# Patient Record
Sex: FEMALE | Race: WHITE | NOT HISPANIC OR LATINO | Employment: FULL TIME | URBAN - METROPOLITAN AREA
[De-identification: names, ages, dates, MRNs, and addresses within clinical notes are randomized per-mention and may not be internally consistent; named-entity substitution may affect disease eponyms.]

---

## 2023-05-22 LAB
ALANINE AMINOTRANSFERASE (SGPT) (U/L) IN SER/PLAS: 19 U/L (ref 7–45)
ALBUMIN (G/DL) IN SER/PLAS: 4.3 G/DL (ref 3.4–5)
ALKALINE PHOSPHATASE (U/L) IN SER/PLAS: 64 U/L (ref 33–110)
ANION GAP IN SER/PLAS: 14 MMOL/L (ref 10–20)
ASPARTATE AMINOTRANSFERASE (SGOT) (U/L) IN SER/PLAS: 14 U/L (ref 9–39)
BASOPHILS (10*3/UL) IN BLOOD BY AUTOMATED COUNT: 0.06 X10E9/L (ref 0–0.1)
BASOPHILS/100 LEUKOCYTES IN BLOOD BY AUTOMATED COUNT: 0.5 % (ref 0–2)
BILIRUBIN TOTAL (MG/DL) IN SER/PLAS: 0.5 MG/DL (ref 0–1.2)
CALCIUM (MG/DL) IN SER/PLAS: 9.1 MG/DL (ref 8.6–10.3)
CARBON DIOXIDE, TOTAL (MMOL/L) IN SER/PLAS: 21 MMOL/L (ref 21–32)
CHLORIDE (MMOL/L) IN SER/PLAS: 108 MMOL/L (ref 98–107)
CREATININE (MG/DL) IN SER/PLAS: 0.87 MG/DL (ref 0.5–1.05)
EOSINOPHILS (10*3/UL) IN BLOOD BY AUTOMATED COUNT: 0.1 X10E9/L (ref 0–0.7)
EOSINOPHILS/100 LEUKOCYTES IN BLOOD BY AUTOMATED COUNT: 0.8 % (ref 0–6)
ERYTHROCYTE DISTRIBUTION WIDTH (RATIO) BY AUTOMATED COUNT: 13 % (ref 11.5–14.5)
ERYTHROCYTE MEAN CORPUSCULAR HEMOGLOBIN CONCENTRATION (G/DL) BY AUTOMATED: 32.1 G/DL (ref 32–36)
ERYTHROCYTE MEAN CORPUSCULAR VOLUME (FL) BY AUTOMATED COUNT: 87 FL (ref 80–100)
ERYTHROCYTES (10*6/UL) IN BLOOD BY AUTOMATED COUNT: 5.25 X10E12/L (ref 4–5.2)
GFR FEMALE: 88 ML/MIN/1.73M2
GLUCOSE (MG/DL) IN SER/PLAS: 98 MG/DL (ref 74–99)
HEMATOCRIT (%) IN BLOOD BY AUTOMATED COUNT: 45.8 % (ref 36–46)
HEMOGLOBIN (G/DL) IN BLOOD: 14.7 G/DL (ref 12–16)
IMMATURE GRANULOCYTES/100 LEUKOCYTES IN BLOOD BY AUTOMATED COUNT: 0.4 % (ref 0–0.9)
LEUKOCYTES (10*3/UL) IN BLOOD BY AUTOMATED COUNT: 12.4 X10E9/L (ref 4.4–11.3)
LYMPHOCYTES (10*3/UL) IN BLOOD BY AUTOMATED COUNT: 3.63 X10E9/L (ref 1.2–4.8)
LYMPHOCYTES/100 LEUKOCYTES IN BLOOD BY AUTOMATED COUNT: 29.3 % (ref 13–44)
MONOCYTES (10*3/UL) IN BLOOD BY AUTOMATED COUNT: 1.01 X10E9/L (ref 0.1–1)
MONOCYTES/100 LEUKOCYTES IN BLOOD BY AUTOMATED COUNT: 8.2 % (ref 2–10)
NEUTROPHILS (10*3/UL) IN BLOOD BY AUTOMATED COUNT: 7.53 X10E9/L (ref 1.2–7.7)
NEUTROPHILS/100 LEUKOCYTES IN BLOOD BY AUTOMATED COUNT: 60.8 % (ref 40–80)
PLATELETS (10*3/UL) IN BLOOD AUTOMATED COUNT: 389 X10E9/L (ref 150–450)
POTASSIUM (MMOL/L) IN SER/PLAS: 3.8 MMOL/L (ref 3.5–5.3)
PROTEIN TOTAL: 7.8 G/DL (ref 6.4–8.2)
SODIUM (MMOL/L) IN SER/PLAS: 139 MMOL/L (ref 136–145)
UREA NITROGEN (MG/DL) IN SER/PLAS: 14 MG/DL (ref 6–23)

## 2023-05-25 LAB
NIL(NEG) CONTROL SPOT COUNT: NORMAL
PANEL A SPOT COUNT: 1
PANEL B SPOT COUNT: 0
POS CONTROL SPOT COUNT: NORMAL
T-SPOT. TB INTERPRETATION: NEGATIVE

## 2023-10-13 ENCOUNTER — OFFICE VISIT (OUTPATIENT)
Dept: PRIMARY CARE | Facility: CLINIC | Age: 38
End: 2023-10-13
Payer: COMMERCIAL

## 2023-10-13 VITALS
BODY MASS INDEX: 42.95 KG/M2 | WEIGHT: 257.8 LBS | HEART RATE: 76 BPM | HEIGHT: 65 IN | SYSTOLIC BLOOD PRESSURE: 140 MMHG | DIASTOLIC BLOOD PRESSURE: 88 MMHG | OXYGEN SATURATION: 99 %

## 2023-10-13 DIAGNOSIS — F41.9 ANXIETY: ICD-10-CM

## 2023-10-13 DIAGNOSIS — I10 PRIMARY HYPERTENSION: Primary | ICD-10-CM

## 2023-10-13 DIAGNOSIS — Z23 NEED FOR VACCINATION: ICD-10-CM

## 2023-10-13 PROBLEM — L40.9 PSORIASIS: Status: ACTIVE | Noted: 2023-07-05

## 2023-10-13 PROCEDURE — 1036F TOBACCO NON-USER: CPT | Performed by: NURSE PRACTITIONER

## 2023-10-13 PROCEDURE — 3077F SYST BP >= 140 MM HG: CPT | Performed by: NURSE PRACTITIONER

## 2023-10-13 PROCEDURE — 90686 IIV4 VACC NO PRSV 0.5 ML IM: CPT | Performed by: NURSE PRACTITIONER

## 2023-10-13 PROCEDURE — 99204 OFFICE O/P NEW MOD 45 MIN: CPT | Performed by: NURSE PRACTITIONER

## 2023-10-13 PROCEDURE — 3079F DIAST BP 80-89 MM HG: CPT | Performed by: NURSE PRACTITIONER

## 2023-10-13 PROCEDURE — 93000 ELECTROCARDIOGRAM COMPLETE: CPT | Performed by: NURSE PRACTITIONER

## 2023-10-13 PROCEDURE — 90471 IMMUNIZATION ADMIN: CPT | Performed by: NURSE PRACTITIONER

## 2023-10-13 RX ORDER — SERTRALINE HYDROCHLORIDE 25 MG/1
25 TABLET, FILM COATED ORAL DAILY
Qty: 30 TABLET | Refills: 1 | Status: SHIPPED | OUTPATIENT
Start: 2023-10-13 | End: 2023-12-08 | Stop reason: SDUPTHER

## 2023-10-13 RX ORDER — ATENOLOL 25 MG/1
25 TABLET ORAL DAILY
Qty: 30 TABLET | Refills: 5 | Status: SHIPPED | OUTPATIENT
Start: 2023-10-13 | End: 2024-04-03 | Stop reason: SDUPTHER

## 2023-10-13 ASSESSMENT — ENCOUNTER SYMPTOMS
HYPERTENSION: 1
DECREASED CONCENTRATION: 1
MALAISE: 0
RESTLESSNESS: 1
MUSCLE TENSION: 1
PALPITATIONS: 1
NERVOUS/ANXIOUS: 1
IRRITABILITY: 0
SHORTNESS OF BREATH: 0

## 2023-10-13 ASSESSMENT — PATIENT HEALTH QUESTIONNAIRE - PHQ9
SUM OF ALL RESPONSES TO PHQ9 QUESTIONS 1 AND 2: 1
3. TROUBLE FALLING OR STAYING ASLEEP OR SLEEPING TOO MUCH: SEVERAL DAYS
7. TROUBLE CONCENTRATING ON THINGS, SUCH AS READING THE NEWSPAPER OR WATCHING TELEVISION: SEVERAL DAYS
5. POOR APPETITE OR OVEREATING: NOT AT ALL
2. FEELING DOWN, DEPRESSED OR HOPELESS: NOT AT ALL
10. IF YOU CHECKED OFF ANY PROBLEMS, HOW DIFFICULT HAVE THESE PROBLEMS MADE IT FOR YOU TO DO YOUR WORK, TAKE CARE OF THINGS AT HOME, OR GET ALONG WITH OTHER PEOPLE: SOMEWHAT DIFFICULT
1. LITTLE INTEREST OR PLEASURE IN DOING THINGS: SEVERAL DAYS
4. FEELING TIRED OR HAVING LITTLE ENERGY: SEVERAL DAYS
8. MOVING OR SPEAKING SO SLOWLY THAT OTHER PEOPLE COULD HAVE NOTICED. OR THE OPPOSITE, BEING SO FIGETY OR RESTLESS THAT YOU HAVE BEEN MOVING AROUND A LOT MORE THAN USUAL: SEVERAL DAYS
SUM OF ALL RESPONSES TO PHQ QUESTIONS 1-9: 7
9. THOUGHTS THAT YOU WOULD BE BETTER OFF DEAD, OR OF HURTING YOURSELF: NOT AT ALL
6. FEELING BAD ABOUT YOURSELF - OR THAT YOU ARE A FAILURE OR HAVE LET YOURSELF OR YOUR FAMILY DOWN: MORE THAN HALF THE DAYS

## 2023-10-13 ASSESSMENT — COLUMBIA-SUICIDE SEVERITY RATING SCALE - C-SSRS
2. HAVE YOU ACTUALLY HAD ANY THOUGHTS OF KILLING YOURSELF?: NO
6. HAVE YOU EVER DONE ANYTHING, STARTED TO DO ANYTHING, OR PREPARED TO DO ANYTHING TO END YOUR LIFE?: NO
1. IN THE PAST MONTH, HAVE YOU WISHED YOU WERE DEAD OR WISHED YOU COULD GO TO SLEEP AND NOT WAKE UP?: NO

## 2023-10-13 ASSESSMENT — ANXIETY QUESTIONNAIRES
3. WORRYING TOO MUCH ABOUT DIFFERENT THINGS: NEARLY EVERY DAY
7. FEELING AFRAID AS IF SOMETHING AWFUL MIGHT HAPPEN: MORE THAN HALF THE DAYS
5. BEING SO RESTLESS THAT IT IS HARD TO SIT STILL: NEARLY EVERY DAY
4. TROUBLE RELAXING: NEARLY EVERY DAY
6. BECOMING EASILY ANNOYED OR IRRITABLE: NEARLY EVERY DAY
IF YOU CHECKED OFF ANY PROBLEMS ON THIS QUESTIONNAIRE, HOW DIFFICULT HAVE THESE PROBLEMS MADE IT FOR YOU TO DO YOUR WORK, TAKE CARE OF THINGS AT HOME, OR GET ALONG WITH OTHER PEOPLE: SOMEWHAT DIFFICULT
1. FEELING NERVOUS, ANXIOUS, OR ON EDGE: NEARLY EVERY DAY
GAD7 TOTAL SCORE: 19
2. NOT BEING ABLE TO STOP OR CONTROL WORRYING: MORE THAN HALF THE DAYS

## 2023-10-13 NOTE — PROGRESS NOTES
"Subjective   Patient ID: Sarahy Rehman is a 37 y.o. female who presents for Hypertension, Establish Care, and Anxiety.    Hypertension  This is a recurrent problem. The problem is uncontrolled. Associated symptoms include anxiety, chest pain and palpitations. Pertinent negatives include no shortness of breath. Risk factors for coronary artery disease include obesity. Past treatments include diuretics. There is no history of angina, kidney disease, CAD/MI, CVA, heart failure, left ventricular hypertrophy, PVD or retinopathy. There is no history of chronic renal disease, coarctation of the aorta, hyperaldosteronism, hypercortisolism, hyperparathyroidism, a hypertension causing med, pheochromocytoma, renovascular disease, sleep apnea or a thyroid problem.   Anxiety  Presents for initial visit. Onset was 6 to 12 months ago. The problem has been gradually worsening. Symptoms include chest pain, decreased concentration, muscle tension, nervous/anxious behavior, palpitations and restlessness. Patient reports no irritability, malaise, shortness of breath or suicidal ideas. Primary symptoms comment: left upper abdomen. Symptoms occur most days. The symptoms are aggravated by work stress. The quality of sleep is fair.     Her past medical history is significant for anxiety/panic attacks. There is no history of anemia, arrhythmia, asthma, hyperthyroidism or suicide attempts.        Review of Systems   Constitutional:  Negative for irritability.   Respiratory:  Negative for shortness of breath.    Cardiovascular:  Positive for chest pain and palpitations.   Psychiatric/Behavioral:  Positive for decreased concentration. Negative for suicidal ideas. The patient is nervous/anxious.        Objective   /88   Pulse 76   Ht 1.651 m (5' 5\")   Wt 117 kg (257 lb 12.8 oz)   LMP 09/08/2023 (Approximate)   SpO2 99%   BMI 42.90 kg/m²     Physical Exam  Constitutional:       Appearance: She is obese.   Cardiovascular:      Rate " and Rhythm: Normal rate.      Heart sounds: Normal heart sounds.   Pulmonary:      Effort: Pulmonary effort is normal.      Breath sounds: Normal breath sounds.   Abdominal:      General: Bowel sounds are normal.      Palpations: Abdomen is soft.   Musculoskeletal:         General: Normal range of motion.   Skin:     General: Skin is warm.      Capillary Refill: Capillary refill takes less than 2 seconds.   Neurological:      General: No focal deficit present.      Mental Status: She is alert and oriented to person, place, and time.   Psychiatric:         Mood and Affect: Mood normal.         Behavior: Behavior normal.         Thought Content: Thought content normal.         Judgment: Judgment normal.         Assessment/Plan   Problem List Items Addressed This Visit             ICD-10-CM    Hypertension - Primary I10    Relevant Medications    atenolol (Tenormin) 25 mg tablet    Other Relevant Orders    Lipid Panel     Other Visit Diagnoses         Codes    Need for vaccination     Z23    Relevant Orders    Flu vaccine (IIV4) age 6 months and greater, preservative free    Anxiety     F41.9    Relevant Medications    sertraline (Zoloft) 25 mg tablet    Other Relevant Orders    TSH with reflex to Free T4 if abnormal        Reviewed patient's medical history establishing care.  Discussed with increasing anxiety and worsening of symptoms we will start her on Zoloft.  Patient reports she has a longstanding history of anxiety.  Routine blood work ordered.  Patient with history of hypertension was told to follow-up after pregnancy we will start her on atenolol to help with blood pressure as well as physical symptoms of anxiety.  Discussed close follow-up with patient

## 2023-10-13 NOTE — PATIENT INSTRUCTIONS
It was a genuine pleasure to meet you. As we discussed, I will be leaving the practice, but your continuous care is crucial, and thus, I want to ensure a smooth transition for you.    Medication and Lab Work:  We are initiating new medications for anxiety and blood pressure. Please monitor for any side effects or changes and inform the office promptly.  I’ve ordered routine blood work for you. Please remember to fast (nothing to eat or drink except black coffee, black tea, or water, with no creamer or sugar) for 10 hours before the test. We will notify you once the results are available.  Follow-Up in 6-8 Weeks:  It's important to review how you’re doing on the new medications and discuss your blood work results.  Transition to Dr. Rincon:  Dr. Rincon in our office is accepting new patients and is highly competent in providing you with excellent care moving forward.  Ensure to schedule your upcoming follow-ups with Dr. Rincon to ensure consistency and continuation in your care.  A letter with additional information, including available nurse practitioners in the [facility] location, will be mailed to you shortly.  Next Steps for You:  Keep an Eye on Symptoms: Note any changes once you begin the new medications.  Monitor Your Blood Pressure: Regular checks will help us understand how well the medication is working.  Upcoming Appointments: Ensure your follow-up is booked, and complete your blood work as instructed.  While I am saddened to be leaving, I am confident in the continued care that Dr. Rincon and the team will provide you. Thank you for allowing me to be part of your healthcare journey, and I wish you all the best in your future health and wellbeing.    Exercise can have significant benefits for managing and reducing anxiety. Regular physical activity has been shown to positively impact both physical and mental health, including helping to alleviate symptoms of anxiety. Here are some ways exercise can help  "with anxiety:    Release of Neurotransmitters: Exercise stimulates the release of neurotransmitters such as endorphins, serotonin, and dopamine. These \"feel-good\" chemicals can improve mood and reduce feelings of anxiety.    Stress Reduction: Physical activity can help reduce stress levels by lowering the body's stress hormones like cortisol. It provides an outlet for releasing tension and pent-up energy.    Distraction and Focus: Engaging in exercise can divert your focus away from anxious thoughts and worries, promoting a state of mindfulness and being present in the moment.    Increased Self-Efficacy: Regular exercise can boost self-confidence and self-esteem, making it easier to cope with anxiety-inducing situations.    Better Sleep: Exercise can improve the quality of sleep, which is essential for managing anxiety. Restful sleep helps the body and mind recover from daily stressors.    Social Interaction: Participating in group exercise classes or team sports can provide opportunities for social interaction, which can help combat feelings of isolation and loneliness.    Energy Release: Anxiety can often lead to restlessness and nervous energy. Physical activity provides a healthy outlet for this energy, reducing restlessness and promoting relaxation.    Long-Term Benefits: Consistent exercise over time can lead to better overall physical health, which can indirectly improve mental well-being and resilience to stressors.  "

## 2023-10-27 ENCOUNTER — SPECIALTY PHARMACY (OUTPATIENT)
Dept: PHARMACY | Facility: CLINIC | Age: 38
End: 2023-10-27

## 2023-10-27 ENCOUNTER — PHARMACY VISIT (OUTPATIENT)
Dept: PHARMACY | Facility: CLINIC | Age: 38
End: 2023-10-27
Payer: COMMERCIAL

## 2023-10-27 PROCEDURE — RXMED WILLOW AMBULATORY MEDICATION CHARGE

## 2023-10-28 ENCOUNTER — LAB (OUTPATIENT)
Dept: LAB | Facility: LAB | Age: 38
End: 2023-10-28
Payer: COMMERCIAL

## 2023-10-28 DIAGNOSIS — F41.9 ANXIETY: ICD-10-CM

## 2023-10-28 DIAGNOSIS — I10 PRIMARY HYPERTENSION: ICD-10-CM

## 2023-10-28 LAB
CHOLEST SERPL-MCNC: 172 MG/DL (ref 0–199)
CHOLESTEROL/HDL RATIO: 4
HDLC SERPL-MCNC: 43.5 MG/DL
LDLC SERPL CALC-MCNC: 100 MG/DL
NON HDL CHOLESTEROL: 129 MG/DL (ref 0–149)
TRIGL SERPL-MCNC: 145 MG/DL (ref 0–149)
TSH SERPL-ACNC: 1.78 MIU/L (ref 0.44–3.98)
VLDL: 29 MG/DL (ref 0–40)

## 2023-10-28 PROCEDURE — 80061 LIPID PANEL: CPT

## 2023-10-28 PROCEDURE — 84443 ASSAY THYROID STIM HORMONE: CPT

## 2023-10-28 PROCEDURE — 36415 COLL VENOUS BLD VENIPUNCTURE: CPT

## 2023-11-07 DIAGNOSIS — B37.2 CANDIDAL SKIN INFECTION: Primary | ICD-10-CM

## 2023-11-07 RX ORDER — NYSTATIN 100000 U/G
CREAM TOPICAL
Qty: 30 G | Refills: 3 | Status: SHIPPED | OUTPATIENT
Start: 2023-11-07

## 2023-11-22 ENCOUNTER — SPECIALTY PHARMACY (OUTPATIENT)
Dept: PHARMACY | Facility: CLINIC | Age: 38
End: 2023-11-22

## 2023-11-22 ENCOUNTER — PHARMACY VISIT (OUTPATIENT)
Dept: PHARMACY | Facility: CLINIC | Age: 38
End: 2023-11-22
Payer: COMMERCIAL

## 2023-11-22 PROCEDURE — RXMED WILLOW AMBULATORY MEDICATION CHARGE

## 2023-11-29 ENCOUNTER — SPECIALTY PHARMACY (OUTPATIENT)
Dept: PHARMACY | Facility: CLINIC | Age: 38
End: 2023-11-29

## 2023-12-08 ENCOUNTER — OFFICE VISIT (OUTPATIENT)
Dept: PRIMARY CARE | Facility: CLINIC | Age: 38
End: 2023-12-08
Payer: COMMERCIAL

## 2023-12-08 ENCOUNTER — APPOINTMENT (OUTPATIENT)
Dept: PRIMARY CARE | Facility: CLINIC | Age: 38
End: 2023-12-08
Payer: COMMERCIAL

## 2023-12-08 VITALS
BODY MASS INDEX: 40.38 KG/M2 | HEART RATE: 51 BPM | OXYGEN SATURATION: 97 % | HEIGHT: 66 IN | DIASTOLIC BLOOD PRESSURE: 78 MMHG | SYSTOLIC BLOOD PRESSURE: 114 MMHG | WEIGHT: 251.25 LBS

## 2023-12-08 DIAGNOSIS — F41.9 ANXIETY: Primary | ICD-10-CM

## 2023-12-08 PROCEDURE — 3074F SYST BP LT 130 MM HG: CPT | Performed by: FAMILY MEDICINE

## 2023-12-08 PROCEDURE — 3078F DIAST BP <80 MM HG: CPT | Performed by: FAMILY MEDICINE

## 2023-12-08 PROCEDURE — 99213 OFFICE O/P EST LOW 20 MIN: CPT | Performed by: FAMILY MEDICINE

## 2023-12-08 PROCEDURE — 1036F TOBACCO NON-USER: CPT | Performed by: FAMILY MEDICINE

## 2023-12-08 RX ORDER — SERTRALINE HYDROCHLORIDE 25 MG/1
25 TABLET, FILM COATED ORAL DAILY
Qty: 90 TABLET | Refills: 1 | Status: SHIPPED | OUTPATIENT
Start: 2023-12-08 | End: 2024-05-30 | Stop reason: SDUPTHER

## 2023-12-08 ASSESSMENT — PATIENT HEALTH QUESTIONNAIRE - PHQ9
5. POOR APPETITE OR OVEREATING: NOT AT ALL
SUM OF ALL RESPONSES TO PHQ9 QUESTIONS 1 AND 2: 0
3. TROUBLE FALLING OR STAYING ASLEEP OR SLEEPING TOO MUCH: NOT AT ALL
10. IF YOU CHECKED OFF ANY PROBLEMS, HOW DIFFICULT HAVE THESE PROBLEMS MADE IT FOR YOU TO DO YOUR WORK, TAKE CARE OF THINGS AT HOME, OR GET ALONG WITH OTHER PEOPLE: NOT DIFFICULT AT ALL
4. FEELING TIRED OR HAVING LITTLE ENERGY: MORE THAN HALF THE DAYS
1. LITTLE INTEREST OR PLEASURE IN DOING THINGS: NOT AT ALL
9. THOUGHTS THAT YOU WOULD BE BETTER OFF DEAD, OR OF HURTING YOURSELF: NOT AT ALL
2. FEELING DOWN, DEPRESSED OR HOPELESS: NOT AT ALL
1. LITTLE INTEREST OR PLEASURE IN DOING THINGS: NOT AT ALL
SUM OF ALL RESPONSES TO PHQ9 QUESTIONS 1 AND 2: 0
SUM OF ALL RESPONSES TO PHQ QUESTIONS 1-9: 2
8. MOVING OR SPEAKING SO SLOWLY THAT OTHER PEOPLE COULD HAVE NOTICED. OR THE OPPOSITE, BEING SO FIGETY OR RESTLESS THAT YOU HAVE BEEN MOVING AROUND A LOT MORE THAN USUAL: NOT AT ALL
6. FEELING BAD ABOUT YOURSELF - OR THAT YOU ARE A FAILURE OR HAVE LET YOURSELF OR YOUR FAMILY DOWN: NOT AT ALL
2. FEELING DOWN, DEPRESSED OR HOPELESS: NOT AT ALL
7. TROUBLE CONCENTRATING ON THINGS, SUCH AS READING THE NEWSPAPER OR WATCHING TELEVISION: NOT AT ALL

## 2023-12-08 ASSESSMENT — ENCOUNTER SYMPTOMS
NAUSEA: 0
WHEEZING: 0
SHORTNESS OF BREATH: 0
NUMBNESS: 0
CONFUSION: 0
DIZZINESS: 0
DEPRESSION: 0
LIGHT-HEADEDNESS: 0
UNEXPECTED WEIGHT CHANGE: 0
DYSURIA: 0
ABDOMINAL PAIN: 0
DIARRHEA: 0
FEVER: 0
COUGH: 0
WEAKNESS: 0
CHILLS: 0
TROUBLE SWALLOWING: 0
VOMITING: 0
BLOOD IN STOOL: 0
OCCASIONAL FEELINGS OF UNSTEADINESS: 0
DIFFICULTY URINATING: 0
LOSS OF SENSATION IN FEET: 0

## 2023-12-08 ASSESSMENT — ANXIETY QUESTIONNAIRES
5. BEING SO RESTLESS THAT IT IS HARD TO SIT STILL: NOT AT ALL
GAD7 TOTAL SCORE: 7
7. FEELING AFRAID AS IF SOMETHING AWFUL MIGHT HAPPEN: NOT AT ALL
2. NOT BEING ABLE TO STOP OR CONTROL WORRYING: SEVERAL DAYS
IF YOU CHECKED OFF ANY PROBLEMS ON THIS QUESTIONNAIRE, HOW DIFFICULT HAVE THESE PROBLEMS MADE IT FOR YOU TO DO YOUR WORK, TAKE CARE OF THINGS AT HOME, OR GET ALONG WITH OTHER PEOPLE: SOMEWHAT DIFFICULT
6. BECOMING EASILY ANNOYED OR IRRITABLE: MORE THAN HALF THE DAYS
1. FEELING NERVOUS, ANXIOUS, OR ON EDGE: MORE THAN HALF THE DAYS
4. TROUBLE RELAXING: SEVERAL DAYS
3. WORRYING TOO MUCH ABOUT DIFFERENT THINGS: SEVERAL DAYS

## 2023-12-08 NOTE — PATIENT INSTRUCTIONS
Please return for a follow-up appointment in 3 months, earlier if any question or concern.      For assistance with scheduling referrals or consultations, please call [phone]. For laboratory, radiology, and other tests, please call [phone] ([phone] for pediatrics). Please review prescription inserts and published information for possible adverse effects of all medications. Return after testing or consultation to review results and recommendations, if symptoms persist, change, worsen, or return, or if you have any question or concern. If you do not get results within 7-10 days, or you have any question or concern, please send a message, call the office ([phone]), or return to the office for a follow-up appointment. For non-emergencies, you may call the office. For emergencies, call 9-1-1 or go to the nearest Emergency Department. Please schedule additional appointment(s) to address concern(s) not addressed today.    In general, results are not released or discussed over the telephone. Results of tests done through [facility] are released via  [employer] (see below).  [email or web address]   Base[employer] support line: [phone]    Until we complete our transition to the new system, additional information can be found at [email or web address] or on your Android or iOS (iPhone, iPad) device using the [employer]to available free of charge in your device's carlito store.

## 2023-12-08 NOTE — PROGRESS NOTES
"Subjective   Patient ID: Sarahy Rehman is a 37 y.o. female who presents for Follow-up (Pt presents for med review, no concerns, rx's needed.BL).  HPI    Doesn't \"fly off the handle as quickly.\" Doesn't feel as jittery or anxious. Sleep is ok. 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 First time she has taken something like this.      Review of Systems   Constitutional:  Negative for chills, fever and unexpected weight change.   HENT:  Negative for ear pain and trouble swallowing.    Respiratory:  Negative for cough, shortness of breath and wheezing.    Cardiovascular:  Negative for chest pain.   Gastrointestinal:  Negative for abdominal pain, blood in stool, diarrhea, nausea and vomiting.   Genitourinary:  Negative for difficulty urinating and dysuria.   Skin:  Negative for rash.   Neurological:  Negative for dizziness, syncope, weakness, light-headedness and numbness.   Psychiatric/Behavioral:  Negative for behavioral problems and confusion.          Objective   /78   Pulse 51   Ht 1.67 m (5' 5.75\")   Wt 114 kg (251 lb 4 oz)   LMP 11/27/2023 (Approximate)   SpO2 97%   BMI 40.86 kg/m²     Physical Exam  Vitals and nursing note reviewed.   Constitutional:       General: She is not in acute distress.     Appearance: Normal appearance.   HENT:      Head: Normocephalic and atraumatic.   Eyes:      General: No scleral icterus.     Extraocular Movements: Extraocular movements intact.      Conjunctiva/sclera: Conjunctivae normal.   Pulmonary:      Effort: Pulmonary effort is normal. No respiratory distress.   Skin:     General: Skin is warm and dry.      Coloration: Skin is not jaundiced.   Neurological:      Mental Status: She is alert and oriented to person, place, and time. Mental status is at baseline.   Psychiatric:         Behavior: Behavior normal.         Assessment/Plan   Problem List Items Addressed This Visit       Anxiety - Primary     Very good response to Sertraline. Would like a call from " [facility]. Continue Sertraline 25 mg. Return 3 months. Plan to continue Sertraline for at least 6 months.         Relevant Medications    sertraline (Zoloft) 25 mg tablet    Other Relevant Orders    Follow Up In Advanced Primary Care - Behavioral Health Collaborative Care CoCM     I have discussed the collaborative care model for this patient's behavioral health care. Written detailed information identifying the members of this care team was provided to the patient. She gives permission for the Behavioral Health Manager ([facility]CLAYTON) and psychiatric consultant to be included in their care with my continued primary management. Patient made aware that services provided as part of the Collaborative Care Model are subject to cost sharing.

## 2023-12-08 NOTE — ASSESSMENT & PLAN NOTE
Very good response to Sertraline. Would like a call from [facility]. Continue Sertraline 25 mg. Return 3 months. Plan to continue Sertraline for at least 6 months.

## 2023-12-20 ENCOUNTER — SPECIALTY PHARMACY (OUTPATIENT)
Dept: PHARMACY | Facility: CLINIC | Age: 38
End: 2023-12-20

## 2023-12-20 PROCEDURE — RXMED WILLOW AMBULATORY MEDICATION CHARGE

## 2023-12-26 ENCOUNTER — PHARMACY VISIT (OUTPATIENT)
Dept: PHARMACY | Facility: CLINIC | Age: 38
End: 2023-12-26
Payer: COMMERCIAL

## 2024-01-03 ENCOUNTER — OFFICE VISIT (OUTPATIENT)
Dept: DERMATOLOGY | Facility: CLINIC | Age: 39
End: 2024-01-03
Payer: COMMERCIAL

## 2024-01-03 DIAGNOSIS — Z85.820 PERSONAL HISTORY OF MALIGNANT MELANOMA OF SKIN: Primary | ICD-10-CM

## 2024-01-03 DIAGNOSIS — D22.72 MELANOCYTIC NEVI OF LEFT LOWER EXTREMITY OR HIP: ICD-10-CM

## 2024-01-03 DIAGNOSIS — D22.71 MELANOCYTIC NEVI OF RIGHT LOWER LIMB, INCLUDING HIP: ICD-10-CM

## 2024-01-03 DIAGNOSIS — L91.0 KELOID: ICD-10-CM

## 2024-01-03 DIAGNOSIS — L40.0 PSORIASIS VULGARIS: ICD-10-CM

## 2024-01-03 DIAGNOSIS — R20.8 SKIN PAIN: ICD-10-CM

## 2024-01-03 DIAGNOSIS — L81.4 LENTIGO: ICD-10-CM

## 2024-01-03 DIAGNOSIS — D22.60 MELANOCYTIC NEVI OF UNSPECIFIED UPPER LIMB, INCLUDING SHOULDER: ICD-10-CM

## 2024-01-03 DIAGNOSIS — Z79.899 ENCOUNTER FOR LONG-TERM (CURRENT) USE OF MEDICATIONS: ICD-10-CM

## 2024-01-03 DIAGNOSIS — D22.5 MELANOCYTIC NEVI OF TRUNK: ICD-10-CM

## 2024-01-03 DIAGNOSIS — L57.8 PHOTOAGING OF SKIN: ICD-10-CM

## 2024-01-03 DIAGNOSIS — Z85.828 PERSONAL HISTORY OF OTHER MALIGNANT NEOPLASM OF SKIN: ICD-10-CM

## 2024-01-03 PROCEDURE — 11900 INJECT SKIN LESIONS </W 7: CPT | Performed by: DERMATOLOGY

## 2024-01-03 PROCEDURE — 99214 OFFICE O/P EST MOD 30 MIN: CPT | Performed by: DERMATOLOGY

## 2024-01-03 PROCEDURE — 1036F TOBACCO NON-USER: CPT | Performed by: DERMATOLOGY

## 2024-01-03 RX ORDER — TRIAMCINOLONE ACETONIDE 40 MG/ML
40 INJECTION, SUSPENSION INTRA-ARTICULAR; INTRAMUSCULAR ONCE
Status: COMPLETED | OUTPATIENT
Start: 2024-01-03 | End: 2024-01-03

## 2024-01-03 RX ADMIN — TRIAMCINOLONE ACETONIDE 40 MG: 40 INJECTION, SUSPENSION INTRA-ARTICULAR; INTRAMUSCULAR at 08:27

## 2024-01-03 ASSESSMENT — DERMATOLOGY PATIENT ASSESSMENT
WHERE ARE THESE NEW OR CHANGING LESIONS LOCATED: CHEST
DO YOU USE A TANNING BED: NO
DO YOU USE SUNSCREEN: DAILY
FOR PATIENTS COMING IN FOR A FOLLOW-UP VISIT - HAVE THERE BEEN ANY CHANGES IN YOUR HEALTH SINCE YOUR LAST VISIT: NO
DO YOU HAVE ANY NEW OR CHANGING LESIONS: YES
ARE YOU AN ORGAN TRANSPLANT RECIPIENT: NO

## 2024-01-03 ASSESSMENT — ITCH NUMERIC RATING SCALE: HOW SEVERE IS YOUR ITCHING?: 3

## 2024-01-03 ASSESSMENT — DERMATOLOGY QUALITY OF LIFE (QOL) ASSESSMENT
WHAT SINGLE SKIN CONDITION LISTED BELOW IS THE PATIENT ANSWERING THE QUALITY-OF-LIFE ASSESSMENT QUESTIONS ABOUT: PSORIASIS
RATE HOW BOTHERED YOU ARE BY SYMPTOMS OF YOUR SKIN PROBLEM (EG, ITCHING, STINGING BURNING, HURTING OR SKIN IRRITATION): 1
RATE HOW BOTHERED YOU ARE BY EFFECTS OF YOUR SKIN PROBLEMS ON YOUR ACTIVITIES (EG, GOING OUT, ACCOMPLISHING WHAT YOU WANT, WORK ACTIVITIES OR YOUR RELATIONSHIPS WITH OTHERS): 0 - NEVER BOTHERED
RATE HOW EMOTIONALLY BOTHERED YOU ARE BY YOUR SKIN PROBLEM (FOR EXAMPLE, WORRY, EMBARRASSMENT, FRUSTRATION): 1
ARE THERE EXCLUSIONS OR EXCEPTIONS FOR THE QUALITY OF LIFE ASSESSMENT: NO

## 2024-01-03 ASSESSMENT — PATIENT GLOBAL ASSESSMENT (PGA): PATIENT GLOBAL ASSESSMENT: PATIENT GLOBAL ASSESSMENT:  2 - MILD

## 2024-01-03 NOTE — PROGRESS NOTES
Subjective     Sarahy Rehman is a 38 y.o. female who presents for the following: Skin Check (Pt here for 6 month FBSE. Hx MM(2020-Rt shoulder), BCC(chest, back). No concerns today.), Keloid (Pt here keloid on chest. Pt had ED&C7/2023. Area is itchy and irritated.), and Psoriasis (Pt is here for 6 months psoriasis follow up. Current treatment Taltz 80mg every 4  weeks, since Sept 2023. Pt is clear today. Last labs/T-spot 5/2023. ).     Review of Systems:  No other skin or systemic complaints other than what is documented elsewhere in the note.    The following portions of the chart were reviewed this encounter and updated as appropriate:         Skin Cancer History  History of melanoma  History of Basal cell carcinoma      Specialty Problems          Dermatology Problems    Psoriasis        Objective   Well appearing patient in no apparent distress; mood and affect are within normal limits.    A full examination was performed including scalp, head, eyes, ears, nose, lips, neck, chest, axillae, abdomen, back, buttocks, bilateral upper extremities, bilateral lower extremities, hands, feet, fingers, toes, fingernails, and toenails. All findings within normal limits unless otherwise noted below.    Assessment/Plan   1. Personal history of malignant melanoma of skin  Right Shoulder - Anterior  Well healed scar at site of prior treatment without evidence of recurrence.    There is no evidence of recurrence or repigmentation on clinical examination today, reassure was provided to the patient. The importance of sun protection was reviewed with the patient including the use of a broad spectrum sunscreen that protects against both UVA/UVB rays, with ingredients such as Zinc oxide or titanium dioxide, wearing sun protective clothing and sun avoidance. ABCDEs of melanoma reviewed. Patient to f/u should they notice any new or changing pre-existing skin lesion. The patient was advised to follow up 6 months for FBSE due to history  "of melanoma.    Related Procedures  Follow Up In Dermatology - Established Patient    2. Keloid  Chest - Medial (Center)  Shiny, thick, fibrotic pink-brown plaque.    Keloids are thick, raised scars that often occur secondary to trauma, surgery, piercings and at times may occur spontaneously.  Treatments are limited, however often do respond to intralesional kenalog(ILK). Keloids often require multiple intralesional kenalog treatments  Occasionally keloids can \"removed\" surgically, however they have a risk of recurrence and still often require intralesional kenalog after removal to prevent recurrence.     Intralesional kenalog (ILK) treatment may require multiple treatments and may cause skin discoloration, atrophy (thinning) of the skin.    Intralesional injection - Chest - Medial (Center)  Intralesional Injection:   Date/Time: 1/3/2024 8:26 AM    Consent:     Consent obtained:  Verbal    Consent given by:  Patient    Risks discussed:  Pain and bleeding    Alternatives discussed:  No treatment and observation  Pre-Procedure Details:     Prep Type:  Isopropyl alcohol  Procedure Details:   Injection:  Triamcinolone  Outcome: patient tolerated procedure well  Comments:  A total of 1 lesions were injected.  0.2 mL of 40 mg/ml were injected.    triamcinolone acetonide (Kenalog-40) injection 40 mg - Chest - Medial (Center)      3. Psoriasis vulgaris  Clear    The chronic and intermittently flaring nature of this skin condition was discussed today.     Well controlled on Taltz 80mg subcutaneously every 4 weeks.    Side effects of medications reviewed.    Patient does get yeast infections of the umbilicus and well controlled with Nystatin as needed.    Labs due 5/2024 - orders placed today    CBC and Auto Differential    Comprehensive metabolic panel    T-SPOT (Tb)    Related Procedures  Follow Up In Dermatology - Established Patient    Related Medications  ixekizumab (Taltz) 80 mg/mL injection  Inject 1 mL (80 mg) under " the skin every 28 (twenty-eight) days for 6 doses.    4. Photoaging of skin  Mottled pigmentation with telangiectasias and brown reticular macules in sun exposed areas of the body.    The risk of chronic, cumulative sun damage and risk of development of skin cancer was reviewed today.   The importance of sun protection was reviewed: including the use of a broad spectrum sunscreen that protects against both UVA/UVB rays, with ingredients such as Zinc oxide or titanium dioxide, wearing sun protective clothing and sun avoidance. We reviewed the warning signs of non-melanoma skin cancer and ABCDEs of melanoma  Please follow up should you notice any new or changing pre-existing skin lesion.    5. Lentigo  Scattered tan macules in sun-exposed areas.    These are benign skin lesions due to sun exposure. They will darken in response to sun exposure. They should be monitored for change in size, shape or color.  These lesions can be treated cosmetically with topical creams, liquid nitrogen and a variety of lasers.    6. Personal history of other malignant neoplasm of skin  Chest - Medial (Center)  Well healed scar at site of prior treatment without evidence of recurrence.    There is no evidence of recurrence on clinical examination today, reassurance was provided to the patient. The importance of sun protection was reviewed with the patient including the use of a broad spectrum sunscreen that protects against both UVA/UVB rays, with ingredients such as Zinc oxide or titanium dioxide, wearing sun protective clothing and sun avoidance. Warning signs of non-melanoma skin cancer were reviewed. ABCDEs of melanoma reviewed. Patient to f/u should they notice any new or changing pre-existing skin lesion    7. Melanocytic nevi of trunk  Torso - Posterior (Back)  Tan-brown symmetric macules and papules    Clinically benign appearing nevi, no treatment is necessary.  The importance of sun protection was reviewed: including the use of a  broad spectrum sunscreen that protects against both UVA/UVB rays, with ingredients such as Zinc oxide or titanium dioxide, wearing sun protective clothing and sun avoidance.   ABCDEs of melanoma reviewed.  Please follow up should you notice any new or changing pre-existing skin lesion.    8. Melanocytic nevi of unspecified upper limb, including shoulder (2)  Left Arm, Right Arm  Scattered, uniform and benign-appearing, regular brown melanocytic papules and macules.    Clinically benign appearing nevi, no treatment is necessary.  The importance of sun protection was reviewed: including the use of a broad spectrum sunscreen that protects against both UVA/UVB rays, with ingredients such as Zinc oxide or titanium dioxide, wearing sun protective clothing and sun avoidance.   ABCDEs of melanoma reviewed.  Please follow up should you notice any new or changing pre-existing skin lesion.    9. Melanocytic nevi of left lower extremity or hip  Left Leg  Scattered, uniform and benign-appearing, regular brown melanocytic papules and macules.    Clinically benign appearing nevi, no treatment is necessary.  The importance of sun protection was reviewed: including the use of a broad spectrum sunscreen that protects against both UVA/UVB rays, with ingredients such as Zinc oxide or titanium dioxide, wearing sun protective clothing and sun avoidance.   ABCDEs of melanoma reviewed.  Please follow up should you notice any new or changing pre-existing skin lesion.    10. Melanocytic nevi of right lower limb, including hip  Right Leg  Scattered, uniform and benign-appearing, regular brown melanocytic papules and macules.    Clinically benign appearing nevi, no treatment is necessary.  The importance of sun protection was reviewed: including the use of a broad spectrum sunscreen that protects against both UVA/UVB rays, with ingredients such as Zinc oxide or titanium dioxide, wearing sun protective clothing and sun avoidance.   ABCDEs of  melanoma reviewed.  Please follow up should you notice any new or changing pre-existing skin lesion.    11. Encounter for long-term (current) use of medications    Related Procedures  CBC and Auto Differential  Comprehensive metabolic panel  T-SPOT (Tb)

## 2024-01-05 ENCOUNTER — APPOINTMENT (OUTPATIENT)
Dept: PRIMARY CARE | Facility: CLINIC | Age: 39
End: 2024-01-05
Payer: COMMERCIAL

## 2024-01-19 ENCOUNTER — SPECIALTY PHARMACY (OUTPATIENT)
Dept: PHARMACY | Facility: CLINIC | Age: 39
End: 2024-01-19

## 2024-01-23 ENCOUNTER — SPECIALTY PHARMACY (OUTPATIENT)
Dept: PHARMACY | Facility: CLINIC | Age: 39
End: 2024-01-23

## 2024-01-25 PROCEDURE — RXMED WILLOW AMBULATORY MEDICATION CHARGE

## 2024-01-26 ENCOUNTER — PHARMACY VISIT (OUTPATIENT)
Dept: PHARMACY | Facility: CLINIC | Age: 39
End: 2024-01-26
Payer: COMMERCIAL

## 2024-01-30 ENCOUNTER — SPECIALTY PHARMACY (OUTPATIENT)
Dept: PHARMACY | Facility: CLINIC | Age: 39
End: 2024-01-30

## 2024-01-30 NOTE — PROGRESS NOTES
[facility] Clinical Note    Sarahy Rehman is a 38 y.o. female, who is on the specialty pharmacy service for management of: Dermatology Core with status of: (Enrolled)     Sarahy was contacted on 1/30/2024.    Refer to the encounter summary report for documentation details about patient counseling and education.      Medication Adherence  The importance of adherence was discussed with the patient and they were advised to take the medication as prescribed by their provider. Sarahy was encouraged to call her physician's office if they have a question regarding a missed dose.     Conclusion  Rate your quality of life on scale of 1-10: -- (unable to assess)  Rate your satisfaction with  Specialty Pharmacy on scale of 1-10: 10 - Completely satisfied      Patient advised to contact the pharmacy if there are any changes to her medication list, including prescriptions, OTC medications, herbal products, or supplements. Patient was advised of [facility]’s dispensing process, refill timeline, contact information ([phone]), and patient management follow up. Patient confirmed understanding of education conducted during assessment. All patient questions and concerns were addressed to the best of my ability. Patient was encouraged to contact the specialty pharmacy with any questions or concerns.    Confirmed follow-up outreaches are properly scheduled. Reviewed goals of therapy in the program targets.    Victor Hugo Wayne, PharmD

## 2024-02-22 ENCOUNTER — SPECIALTY PHARMACY (OUTPATIENT)
Dept: PHARMACY | Facility: CLINIC | Age: 39
End: 2024-02-22

## 2024-02-26 ENCOUNTER — PHARMACY VISIT (OUTPATIENT)
Dept: PHARMACY | Facility: CLINIC | Age: 39
End: 2024-02-26
Payer: COMMERCIAL

## 2024-02-26 PROCEDURE — RXMED WILLOW AMBULATORY MEDICATION CHARGE

## 2024-03-22 ENCOUNTER — SPECIALTY PHARMACY (OUTPATIENT)
Dept: PHARMACY | Facility: CLINIC | Age: 39
End: 2024-03-22

## 2024-03-25 PROCEDURE — RXMED WILLOW AMBULATORY MEDICATION CHARGE

## 2024-03-26 ENCOUNTER — PHARMACY VISIT (OUTPATIENT)
Dept: PHARMACY | Facility: CLINIC | Age: 39
End: 2024-03-26
Payer: COMMERCIAL

## 2024-04-03 ENCOUNTER — TELEPHONE (OUTPATIENT)
Dept: PRIMARY CARE | Facility: CLINIC | Age: 39
End: 2024-04-03
Payer: COMMERCIAL

## 2024-04-03 DIAGNOSIS — I10 PRIMARY HYPERTENSION: ICD-10-CM

## 2024-04-03 NOTE — TELEPHONE ENCOUNTER
Pharmacy: [facility]   Medication(s): atenolol   Dose: 25 mg once dialy   Qty:   Last Office Visit: 12/08/2023  Next Office Visit: 08/05/2024

## 2024-04-05 RX ORDER — ATENOLOL 25 MG/1
25 TABLET ORAL DAILY
Qty: 30 TABLET | Refills: 5 | Status: SHIPPED | OUTPATIENT
Start: 2024-04-05 | End: 2024-10-02

## 2024-04-18 ENCOUNTER — SPECIALTY PHARMACY (OUTPATIENT)
Dept: PHARMACY | Facility: CLINIC | Age: 39
End: 2024-04-18

## 2024-04-18 PROCEDURE — RXMED WILLOW AMBULATORY MEDICATION CHARGE

## 2024-04-22 ENCOUNTER — PHARMACY VISIT (OUTPATIENT)
Dept: PHARMACY | Facility: CLINIC | Age: 39
End: 2024-04-22
Payer: COMMERCIAL

## 2024-05-15 ENCOUNTER — SPECIALTY PHARMACY (OUTPATIENT)
Dept: PHARMACY | Facility: CLINIC | Age: 39
End: 2024-05-15

## 2024-05-15 PROCEDURE — RXMED WILLOW AMBULATORY MEDICATION CHARGE

## 2024-05-20 ENCOUNTER — TELEMEDICINE CLINICAL SUPPORT (OUTPATIENT)
Dept: PHARMACY | Facility: HOSPITAL | Age: 39
End: 2024-05-20
Payer: COMMERCIAL

## 2024-05-20 DIAGNOSIS — L40.0 PSORIASIS VULGARIS: ICD-10-CM

## 2024-05-20 NOTE — PROGRESS NOTES
[facility] Clinical Note    Sarahy Rehman is a 38 y.o. female, who is on the specialty pharmacy service for management of: Dermatology Core with status of: (Enrolled)     Sarahy was contacted on 5/20/2024 at 1:47 PM for a virtual pharmacy visit with encounter number [id number] from the [id number]  [address]  [address]  [facility] [address]  Dept: [phone]  Dept Fax: [phone]  Loc: [phone]    Sarahy was offered a Telemedicine Video visit or Telephone visit.  Sarahy consented to a telephone visit, which was performed.    Sarahy is taking: Taltz.   The most recent encounter visit with the referring prescriber Dr. Brenda Cesar on 1/3/24 was reviewed.  Pharmacy will continue to collaborate in the care of this patient with the referring prescriber Dr. Brenda Cesar.    General Assessment       Impression/Plan  IMPRESSION/PLAN:  Is patient high risk (potential patients:  pregnancy, geriatric, pediatric)?  no  Is laboratory follow-up needed? no  Is a clinical intervention needed? no  Next reassessment date? 6 months   Additional comments:     Refer to the encounter summary report for documentation details about patient counseling and education.      Medication Adherence  The importance of adherence was discussed with the patient and they were advised to take the medication as prescribed by their provider. Sarahy was encouraged to call her physician's office if they have a question regarding a missed dose.        Patient advised to contact the pharmacy if there are any changes to her medication list, including prescriptions, OTC medications, herbal products, or supplements. Patient was advised of [facility]’s dispensing process, refill timeline, contact information ([phone]), and patient management follow up. Patient confirmed understanding of education conducted during assessment. All  patient questions and concerns were addressed to the best of my ability. Patient was encouraged to contact the specialty pharmacy with any questions or concerns.    Confirmed follow-up outreaches are properly scheduled. Reviewed goals of therapy in the program targets.    Usman Alvares, PharmD

## 2024-05-22 ENCOUNTER — PHARMACY VISIT (OUTPATIENT)
Dept: PHARMACY | Facility: CLINIC | Age: 39
End: 2024-05-22
Payer: COMMERCIAL

## 2024-05-30 ENCOUNTER — TELEPHONE (OUTPATIENT)
Dept: PRIMARY CARE | Facility: CLINIC | Age: 39
End: 2024-05-30
Payer: COMMERCIAL

## 2024-05-30 DIAGNOSIS — F41.9 ANXIETY: ICD-10-CM

## 2024-05-30 NOTE — TELEPHONE ENCOUNTER
----- Message from Sarahy Rehman sent at 5/30/2024 12:31 PM EDT -----  Regarding: Sertraline refill   Contact: [phone]  Hello, I only have 8 sertraline pills left. I have an appointment on August 5th. Can I get a refill to last me until my appointment?

## 2024-05-31 RX ORDER — SERTRALINE HYDROCHLORIDE 25 MG/1
25 TABLET, FILM COATED ORAL DAILY
Qty: 90 TABLET | Refills: 0 | Status: SHIPPED | OUTPATIENT
Start: 2024-05-31 | End: 2024-06-07

## 2024-06-07 DIAGNOSIS — F41.9 ANXIETY: ICD-10-CM

## 2024-06-07 RX ORDER — SERTRALINE HYDROCHLORIDE 25 MG/1
25 TABLET, FILM COATED ORAL DAILY
Qty: 90 TABLET | Refills: 0 | Status: SHIPPED | OUTPATIENT
Start: 2024-06-07

## 2024-06-14 ENCOUNTER — LAB (OUTPATIENT)
Dept: LAB | Facility: LAB | Age: 39
End: 2024-06-14
Payer: COMMERCIAL

## 2024-06-14 DIAGNOSIS — L40.0 PSORIASIS VULGARIS: ICD-10-CM

## 2024-06-14 DIAGNOSIS — Z79.899 ENCOUNTER FOR LONG-TERM (CURRENT) USE OF MEDICATIONS: ICD-10-CM

## 2024-06-14 LAB
ALBUMIN SERPL BCP-MCNC: 4.2 G/DL (ref 3.4–5)
ALP SERPL-CCNC: 61 U/L (ref 33–110)
ALT SERPL W P-5'-P-CCNC: 16 U/L (ref 7–45)
ANION GAP SERPL CALC-SCNC: 14 MMOL/L (ref 10–20)
AST SERPL W P-5'-P-CCNC: 17 U/L (ref 9–39)
BASOPHILS # BLD AUTO: 0.03 X10*3/UL (ref 0–0.1)
BASOPHILS NFR BLD AUTO: 0.4 %
BILIRUB SERPL-MCNC: 0.9 MG/DL (ref 0–1.2)
BUN SERPL-MCNC: 10 MG/DL (ref 6–23)
CALCIUM SERPL-MCNC: 8.9 MG/DL (ref 8.6–10.3)
CHLORIDE SERPL-SCNC: 107 MMOL/L (ref 98–107)
CO2 SERPL-SCNC: 21 MMOL/L (ref 21–32)
CREAT SERPL-MCNC: 0.82 MG/DL (ref 0.5–1.05)
EGFRCR SERPLBLD CKD-EPI 2021: >90 ML/MIN/1.73M*2
EOSINOPHIL # BLD AUTO: 0.2 X10*3/UL (ref 0–0.7)
EOSINOPHIL NFR BLD AUTO: 2.9 %
ERYTHROCYTE [DISTWIDTH] IN BLOOD BY AUTOMATED COUNT: 12.5 % (ref 11.5–14.5)
GLUCOSE SERPL-MCNC: 105 MG/DL (ref 74–99)
HCT VFR BLD AUTO: 43 % (ref 36–46)
HGB BLD-MCNC: 13.9 G/DL (ref 12–16)
IMM GRANULOCYTES # BLD AUTO: 0.02 X10*3/UL (ref 0–0.7)
IMM GRANULOCYTES NFR BLD AUTO: 0.3 % (ref 0–0.9)
LYMPHOCYTES # BLD AUTO: 2.44 X10*3/UL (ref 1.2–4.8)
LYMPHOCYTES NFR BLD AUTO: 35.6 %
MCH RBC QN AUTO: 28.7 PG (ref 26–34)
MCHC RBC AUTO-ENTMCNC: 32.3 G/DL (ref 32–36)
MCV RBC AUTO: 89 FL (ref 80–100)
MONOCYTES # BLD AUTO: 0.66 X10*3/UL (ref 0.1–1)
MONOCYTES NFR BLD AUTO: 9.6 %
NEUTROPHILS # BLD AUTO: 3.5 X10*3/UL (ref 1.2–7.7)
NEUTROPHILS NFR BLD AUTO: 51.2 %
NRBC BLD-RTO: 0 /100 WBCS (ref 0–0)
PLATELET # BLD AUTO: 343 X10*3/UL (ref 150–450)
POTASSIUM SERPL-SCNC: 4 MMOL/L (ref 3.5–5.3)
PROT SERPL-MCNC: 6.9 G/DL (ref 6.4–8.2)
RBC # BLD AUTO: 4.85 X10*6/UL (ref 4–5.2)
SODIUM SERPL-SCNC: 138 MMOL/L (ref 136–145)
WBC # BLD AUTO: 6.9 X10*3/UL (ref 4.4–11.3)

## 2024-06-14 PROCEDURE — 85025 COMPLETE CBC W/AUTO DIFF WBC: CPT

## 2024-06-14 PROCEDURE — 36415 COLL VENOUS BLD VENIPUNCTURE: CPT

## 2024-06-14 PROCEDURE — 86481 TB AG RESPONSE T-CELL SUSP: CPT

## 2024-06-14 PROCEDURE — 80053 COMPREHEN METABOLIC PANEL: CPT

## 2024-06-16 LAB
NIL(NEG) CONTROL SPOT COUNT: NORMAL
PANEL A SPOT COUNT: 0
PANEL B SPOT COUNT: 0
POS CONTROL SPOT COUNT: NORMAL
T-SPOT. TB INTERPRETATION: NEGATIVE

## 2024-06-21 ENCOUNTER — SPECIALTY PHARMACY (OUTPATIENT)
Dept: PHARMACY | Facility: CLINIC | Age: 39
End: 2024-06-21

## 2024-06-21 PROCEDURE — RXMED WILLOW AMBULATORY MEDICATION CHARGE

## 2024-06-22 ENCOUNTER — PHARMACY VISIT (OUTPATIENT)
Dept: PHARMACY | Facility: CLINIC | Age: 39
End: 2024-06-22
Payer: COMMERCIAL

## 2024-07-03 ENCOUNTER — APPOINTMENT (OUTPATIENT)
Dept: DERMATOLOGY | Facility: CLINIC | Age: 39
End: 2024-07-03
Payer: COMMERCIAL

## 2024-07-10 ENCOUNTER — APPOINTMENT (OUTPATIENT)
Dept: DERMATOLOGY | Facility: CLINIC | Age: 39
End: 2024-07-10
Payer: COMMERCIAL

## 2024-07-10 DIAGNOSIS — D22.60 MELANOCYTIC NEVI OF UNSPECIFIED UPPER LIMB, INCLUDING SHOULDER: ICD-10-CM

## 2024-07-10 DIAGNOSIS — L57.8 PHOTOAGING OF SKIN: Primary | ICD-10-CM

## 2024-07-10 DIAGNOSIS — D22.71 MELANOCYTIC NEVI OF RIGHT LOWER LIMB, INCLUDING HIP: ICD-10-CM

## 2024-07-10 DIAGNOSIS — L40.0 PSORIASIS VULGARIS: ICD-10-CM

## 2024-07-10 DIAGNOSIS — L91.0 KELOID: ICD-10-CM

## 2024-07-10 DIAGNOSIS — L81.4 LENTIGO: ICD-10-CM

## 2024-07-10 DIAGNOSIS — Z85.820 PERSONAL HISTORY OF MALIGNANT MELANOMA OF SKIN: ICD-10-CM

## 2024-07-10 DIAGNOSIS — D22.72 MELANOCYTIC NEVI OF LEFT LOWER EXTREMITY OR HIP: ICD-10-CM

## 2024-07-10 DIAGNOSIS — D22.5 MELANOCYTIC NEVI OF TRUNK: ICD-10-CM

## 2024-07-10 DIAGNOSIS — Z85.828 PERSONAL HISTORY OF OTHER MALIGNANT NEOPLASM OF SKIN: ICD-10-CM

## 2024-07-10 PROCEDURE — 1036F TOBACCO NON-USER: CPT | Performed by: NURSE PRACTITIONER

## 2024-07-10 PROCEDURE — 99214 OFFICE O/P EST MOD 30 MIN: CPT | Performed by: NURSE PRACTITIONER

## 2024-07-10 NOTE — PROGRESS NOTES
Subjective     Sarahy Rehman is a 38 y.o. female who presents for the following: Skin Check and Psoriasis.   Established patient in today for 6 month FBSE.     Psoriasis- currently treating with Taltz 80mg every 4  weeks, since Sept 2023.   Last T-Spot - 06/2024  Has tried: Skyrizi , Otezla [id number], Topicals, Cosentyx 04/2020-11/2020, Taltz 2018-04/2020    Review of Systems:  No other skin or systemic complaints other than what is documented elsewhere in the note.    The following portions of the chart were reviewed this encounter and updated as appropriate:       Skin Cancer History  Melanoma-2020-Right shoulder  BCC-2017- mid back     Specialty Problems          Dermatology Problems    Basal cell carcinoma (BCC) of back    History of malignant melanoma    Psoriasis     Past Medical History:  Sarahy Rehman  has a past medical history of Acute bronchitis (08/02/2024), Bacterial vaginosis (08/02/2024), Basal cell carcinoma of skin of other part of trunk, Contact with and (suspected) exposure to covid-19 (11/22/2022), Cough (08/02/2024), Personal history of malignant melanoma of skin, Personal history of other diseases of the circulatory system, Personal history of other drug therapy, Rash and other nonspecific skin eruption (04/19/2016), Vaginal discharge (08/02/2024), Varicella (08/02/2024), Varicella without complication, Viral gastroenteritis (08/02/2024), and Viral upper respiratory tract infection (08/02/2024).    Past Surgical History:  Sarahy Rehman  has a past surgical history that includes Gallbladder surgery (04/17/2015); Mouth surgery (04/17/2015); Other surgical history (04/17/2015); and Other surgical history (10/23/2017).    Family History:  Patient family history includes Diabetes in her father, father's brother, and paternal grandfather.    Social History:  Sarahy Rehman  reports that she quit smoking about 2 years ago. Her smoking use included cigarettes. She has never used smokeless tobacco.  She reports that she does not drink alcohol and does not use drugs.    Allergies:  Diazepam, Metoclopramide, and Metoclopramide hcl    Current Medications / CAM's:    Current Outpatient Medications:     atenolol (Tenormin) 25 mg tablet, Take 1 tablet (25 mg) by mouth once daily., Disp: 100 tablet, Rfl: 1    ixekizumab (Taltz) 80 mg/mL injection, Inject 1 mL (80 mg) under the skin every 28 (twenty-eight) days, Disp: 1 mL, Rfl: 0    nystatin (Mycostatin) cream, Apply to the affected areas 2-3x daily, Disp: 30 g, Rfl: 3    sertraline (Zoloft) 50 mg tablet, Take 1 tablet (50 mg) by mouth once daily., Disp: 100 tablet, Rfl: 1     Objective   Well appearing patient in no apparent distress; mood and affect are within normal limits.    A focused skin examination was performed. All findings within normal limits unless otherwise noted below.    Assessment/Plan   1. Photoaging of skin  Mottled pigmentation with telangiectasias and brown reticular macules in sun exposed areas of the body.    The risk of chronic, cumulative sun damage and risk of development of skin cancer was reviewed today.   The importance of sun protection was reviewed: including the use of a broad spectrum sunscreen that protects against both UVA/UVB rays, with ingredients such as Zinc oxide or titanium dioxide, wearing sun protective clothing and sun avoidance. We reviewed the warning signs of non-melanoma skin cancer and ABCDEs of melanoma  Please follow up should you notice any new or changing pre-existing skin lesion.    2. Personal history of malignant melanoma of skin  Right Shoulder - Anterior  Well healed scar at site of prior treatment without evidence of recurrence.    There is no evidence of recurrence or repigmentation on clinical examination today, reassure was provided to the patient. The importance of sun protection was reviewed with the patient including the use of a broad spectrum sunscreen that protects against both UVA/UVB rays, with  ingredients such as Zinc oxide or titanium dioxide, wearing sun protective clothing and sun avoidance. ABCDEs of melanoma reviewed. Patient to f/u should they notice any new or changing pre-existing skin lesion. The patient was advised to follow up 6 months for FBSE due to history of melanoma.    Related Procedures  Follow Up In Dermatology - Established Patient    3. Psoriasis vulgaris  Clear on exam, patient is happy with treatment    The chronic and intermittently flaring nature of this skin condition was discussed today.     Well controlled on Taltz 80mg subcutaneously every 4 weeks.    Side effects of medications reviewed. Patient is aware that Taltz (ixekizumab) has not been studied in patients with a history of cancer, but its product labeling does not contain a contraindication for use in this population.    Patient does get yeast infections of the umbilicus and well controlled with Nystatin as needed.    TSPOT negative 6/2024    Related Procedures  Follow Up In Dermatology - Established Patient    Related Medications  ixekizumab (Taltz) 80 mg/mL injection  Inject 1 mL (80 mg) under the skin every 28 (twenty-eight) days    4. Lentigo  Scattered tan macules in sun-exposed areas.    These are benign skin lesions due to sun exposure. They will darken in response to sun exposure. They should be monitored for change in size, shape or color.  These lesions can be treated cosmetically with topical creams, liquid nitrogen and a variety of lasers.    5. Personal history of other malignant neoplasm of skin  Chest - Medial (Center)  Well healed scar at site of prior treatment without evidence of recurrence.    There is no evidence of recurrence on clinical examination today, reassurance was provided to the patient. The importance of sun protection was reviewed with the patient including the use of a broad spectrum sunscreen that protects against both UVA/UVB rays, with ingredients such as Zinc oxide or titanium  "dioxide, wearing sun protective clothing and sun avoidance. Warning signs of non-melanoma skin cancer were reviewed. ABCDEs of melanoma reviewed. Patient to f/u should they notice any new or changing pre-existing skin lesion    6. Keloid  Chest - Medial (Center)  Shiny, thick, fibrotic pink-brown plaque improved since last injection 6 months ago    Keloids are thick, raised scars that often occur secondary to trauma, surgery, piercings and at times may occur spontaneously.  Treatments are limited, however often do respond to intralesional kenalog(ILK). Keloids often require multiple intralesional kenalog treatments  Occasionally keloids can \"removed\" surgically, however they have a risk of recurrence and still often require intralesional kenalog after removal to prevent recurrence.     Intralesional kenalog (ILK) treatment may require multiple treatments and may cause skin discoloration, atrophy (thinning) of the skin.    Subcutaneous Kenalog 40mg/ml tolerated well today    7. Melanocytic nevi of trunk  Torso - Posterior (Back)  Tan-brown symmetric macules and papules    Clinically benign appearing nevi, no treatment is necessary.  The importance of sun protection was reviewed: including the use of a broad spectrum sunscreen that protects against both UVA/UVB rays, with ingredients such as Zinc oxide or titanium dioxide, wearing sun protective clothing and sun avoidance.   ABCDEs of melanoma reviewed.  Please follow up should you notice any new or changing pre-existing skin lesion.    8. Melanocytic nevi of unspecified upper limb, including shoulder (2)  Left Arm, Right Arm  Scattered, uniform and benign-appearing, regular brown melanocytic papules and macules.    Clinically benign appearing nevi, no treatment is necessary.  The importance of sun protection was reviewed: including the use of a broad spectrum sunscreen that protects against both UVA/UVB rays, with ingredients such as Zinc oxide or titanium dioxide, " wearing sun protective clothing and sun avoidance.   ABCDEs of melanoma reviewed.  Please follow up should you notice any new or changing pre-existing skin lesion.    9. Melanocytic nevi of left lower extremity or hip  Left Leg  Scattered, uniform and benign-appearing, regular brown melanocytic papules and macules.    Clinically benign appearing nevi, no treatment is necessary.  The importance of sun protection was reviewed: including the use of a broad spectrum sunscreen that protects against both UVA/UVB rays, with ingredients such as Zinc oxide or titanium dioxide, wearing sun protective clothing and sun avoidance.   ABCDEs of melanoma reviewed.  Please follow up should you notice any new or changing pre-existing skin lesion.    10. Melanocytic nevi of right lower limb, including hip  Right Leg  Scattered, uniform and benign-appearing, regular brown melanocytic papules and macules.    Clinically benign appearing nevi, no treatment is necessary.  The importance of sun protection was reviewed: including the use of a broad spectrum sunscreen that protects against both UVA/UVB rays, with ingredients such as Zinc oxide or titanium dioxide, wearing sun protective clothing and sun avoidance.   ABCDEs of melanoma reviewed.  Please follow up should you notice any new or changing pre-existing skin lesion.

## 2024-07-11 PROCEDURE — RXMED WILLOW AMBULATORY MEDICATION CHARGE

## 2024-07-19 ENCOUNTER — SPECIALTY PHARMACY (OUTPATIENT)
Dept: PHARMACY | Facility: CLINIC | Age: 39
End: 2024-07-19

## 2024-07-24 ENCOUNTER — PHARMACY VISIT (OUTPATIENT)
Dept: PHARMACY | Facility: CLINIC | Age: 39
End: 2024-07-24
Payer: COMMERCIAL

## 2024-08-02 PROBLEM — B96.89 BACTERIAL VAGINOSIS: Status: RESOLVED | Noted: 2024-08-02 | Resolved: 2024-08-02

## 2024-08-02 PROBLEM — Z85.820 HISTORY OF MALIGNANT MELANOMA: Status: ACTIVE | Noted: 2022-01-19

## 2024-08-02 PROBLEM — Z20.822 CONTACT WITH AND (SUSPECTED) EXPOSURE TO COVID-19: Status: RESOLVED | Noted: 2022-11-22 | Resolved: 2024-08-02

## 2024-08-02 PROBLEM — O20.0 THREATENED ABORTION (HHS-HCC): Status: RESOLVED | Noted: 2024-08-02 | Resolved: 2024-08-02

## 2024-08-02 PROBLEM — R05.9 COUGH: Status: RESOLVED | Noted: 2024-08-02 | Resolved: 2024-08-02

## 2024-08-02 PROBLEM — R60.0 EDEMA OF LOWER EXTREMITY: Status: ACTIVE | Noted: 2024-08-02

## 2024-08-02 PROBLEM — J20.9 ACUTE BRONCHITIS: Status: RESOLVED | Noted: 2024-08-02 | Resolved: 2024-08-02

## 2024-08-02 PROBLEM — A08.4 VIRAL GASTROENTERITIS: Status: RESOLVED | Noted: 2024-08-02 | Resolved: 2024-08-02

## 2024-08-02 PROBLEM — N76.0 BACTERIAL VAGINOSIS: Status: RESOLVED | Noted: 2024-08-02 | Resolved: 2024-08-02

## 2024-08-02 PROBLEM — O99.810 IMPAIRED GLUCOSE TOLERANCE DURING PREGNANCY (HHS-HCC): Status: ACTIVE | Noted: 2022-10-21

## 2024-08-02 PROBLEM — Z86.79 HISTORY OF HYPERTENSION: Status: ACTIVE | Noted: 2024-08-02

## 2024-08-02 PROBLEM — R93.5 ABNORMAL COMPUTERIZED AXIAL TOMOGRAPHY OF ABDOMEN: Status: ACTIVE | Noted: 2022-11-21

## 2024-08-02 PROBLEM — J06.9 VIRAL UPPER RESPIRATORY TRACT INFECTION: Status: RESOLVED | Noted: 2024-08-02 | Resolved: 2024-08-02

## 2024-08-02 PROBLEM — Z22.7 LATENT TUBERCULOSIS: Status: ACTIVE | Noted: 2024-08-02

## 2024-08-02 PROBLEM — N89.8 VAGINAL DISCHARGE: Status: RESOLVED | Noted: 2024-08-02 | Resolved: 2024-08-02

## 2024-08-02 PROBLEM — I99.8 VASCULAR INSUFFICIENCY: Status: ACTIVE | Noted: 2024-08-02

## 2024-08-02 PROBLEM — B01.9 VARICELLA: Status: RESOLVED | Noted: 2024-08-02 | Resolved: 2024-08-02

## 2024-08-02 PROBLEM — E66.9 OBESITY: Status: ACTIVE | Noted: 2024-08-02

## 2024-08-02 PROBLEM — C44.519 BASAL CELL CARCINOMA (BCC) OF BACK: Status: ACTIVE | Noted: 2022-01-19

## 2024-08-05 ENCOUNTER — APPOINTMENT (OUTPATIENT)
Dept: PRIMARY CARE | Facility: CLINIC | Age: 39
End: 2024-08-05
Payer: COMMERCIAL

## 2024-08-05 VITALS
BODY MASS INDEX: 43.11 KG/M2 | OXYGEN SATURATION: 96 % | SYSTOLIC BLOOD PRESSURE: 137 MMHG | HEART RATE: 63 BPM | DIASTOLIC BLOOD PRESSURE: 90 MMHG | WEIGHT: 268.25 LBS | HEIGHT: 66 IN

## 2024-08-05 DIAGNOSIS — F41.9 ANXIETY: ICD-10-CM

## 2024-08-05 DIAGNOSIS — I10 PRIMARY HYPERTENSION: Primary | ICD-10-CM

## 2024-08-05 PROCEDURE — 99214 OFFICE O/P EST MOD 30 MIN: CPT | Performed by: FAMILY MEDICINE

## 2024-08-05 PROCEDURE — 3008F BODY MASS INDEX DOCD: CPT | Performed by: FAMILY MEDICINE

## 2024-08-05 PROCEDURE — 3080F DIAST BP >= 90 MM HG: CPT | Performed by: FAMILY MEDICINE

## 2024-08-05 PROCEDURE — 1036F TOBACCO NON-USER: CPT | Performed by: FAMILY MEDICINE

## 2024-08-05 PROCEDURE — 3075F SYST BP GE 130 - 139MM HG: CPT | Performed by: FAMILY MEDICINE

## 2024-08-05 RX ORDER — ATENOLOL 25 MG/1
25 TABLET ORAL DAILY
Qty: 100 TABLET | Refills: 1 | Status: SHIPPED | OUTPATIENT
Start: 2024-08-05

## 2024-08-05 RX ORDER — SERTRALINE HYDROCHLORIDE 50 MG/1
50 TABLET, FILM COATED ORAL DAILY
Qty: 100 TABLET | Refills: 1 | Status: SHIPPED | OUTPATIENT
Start: 2024-08-05

## 2024-08-05 ASSESSMENT — ANXIETY QUESTIONNAIRES
2. NOT BEING ABLE TO STOP OR CONTROL WORRYING: NOT AT ALL
4. TROUBLE RELAXING: SEVERAL DAYS
1. FEELING NERVOUS, ANXIOUS, OR ON EDGE: NEARLY EVERY DAY
5. BEING SO RESTLESS THAT IT IS HARD TO SIT STILL: NOT AT ALL
GAD7 TOTAL SCORE: 8
3. WORRYING TOO MUCH ABOUT DIFFERENT THINGS: SEVERAL DAYS
7. FEELING AFRAID AS IF SOMETHING AWFUL MIGHT HAPPEN: NOT AT ALL
6. BECOMING EASILY ANNOYED OR IRRITABLE: NEARLY EVERY DAY
IF YOU CHECKED OFF ANY PROBLEMS ON THIS QUESTIONNAIRE, HOW DIFFICULT HAVE THESE PROBLEMS MADE IT FOR YOU TO DO YOUR WORK, TAKE CARE OF THINGS AT HOME, OR GET ALONG WITH OTHER PEOPLE: VERY DIFFICULT

## 2024-08-05 ASSESSMENT — ENCOUNTER SYMPTOMS
DIAPHORESIS: 0
HEADACHES: 0
PALPITATIONS: 0
CHOKING: 0
WHEEZING: 0
COUGH: 0
SHORTNESS OF BREATH: 0
UNEXPECTED WEIGHT CHANGE: 0
OCCASIONAL FEELINGS OF UNSTEADINESS: 0
APNEA: 0
CHILLS: 0
LIGHT-HEADEDNESS: 0
CHEST TIGHTNESS: 0
DEPRESSION: 0
NERVOUS/ANXIOUS: 1
DIZZINESS: 0
LOSS OF SENSATION IN FEET: 0
FEVER: 0

## 2024-08-05 ASSESSMENT — PATIENT HEALTH QUESTIONNAIRE - PHQ9
1. LITTLE INTEREST OR PLEASURE IN DOING THINGS: NOT AT ALL
2. FEELING DOWN, DEPRESSED OR HOPELESS: NOT AT ALL
SUM OF ALL RESPONSES TO PHQ9 QUESTIONS 1 AND 2: 0

## 2024-08-05 NOTE — PATIENT INSTRUCTIONS
Monitor your resting blood pressure (BP) and heart rate (HR) at home. Resting BP should be fairly consistently lower than 140/90, preferably better than 130/80. Systolic BP (the top number) should generally stay higher than 100. Normal HR range is , as low as 50 might be acceptable. Please bring your blood pressure cuff to your appointments.  For a list of validated BP cuffs: [email or web address].validatebp.org/    Please return for a(n) blood pressure, anxiety, and medication follow-up appointment in 3 months, earlier if any question or concern. Please schedule additional problem-focused appointment(s) to address additional problem(s).    Avoid taking Biotin (a vitamin, shows up particularly in hair/nail supplements) for a week prior to any blood tests, as it can interfere with certain results. Fasting for labs means 12 hours, nothing to eat or drink, except water and medications, unless directed otherwise.    For assistance with scheduling referrals or consultations, please call [phone]. For laboratory, radiology, and other tests, please call [phone] ([phone] for pediatrics). Please review prescription inserts and published information for possible adverse effects of all medications. Return after testing or consultation to review results and recommendations, if symptoms persist, change, worsen, or return, or if you have any question or concern. If you do not get results within 7-10 days, or you have any question or concern, please send a message, call the office ([phone]), or return to the office for a follow-up appointment. For non-emergencies, you may call the office. For emergencies, call 9-1-1 or go to the nearest Emergency Department. Please schedule additional appointment(s) to address concern(s) not addressed today.    In general, results are not released or discussed over the telephone, but at an appointment or via  [employer]. Results of tests done through [facility] are released  via  [employer] (see below).  [email or web address]   [employer] support line: [phone]

## 2024-08-05 NOTE — PROGRESS NOTES
"Subjective   Patient ID: Sarahy Rehman is a 38 y.o. female who presents for Anxiety and Hypertension (Pt presents for Htn, anxiety checkup, pt c/o anxiety meds not working as good as they used to. Rx' may be needed. BL).  HPI Historian(s): Self    Generally feeling well.    But c/o increased anxiety.    Review of Systems   Constitutional:  Negative for chills, diaphoresis, fever and unexpected weight change.   Eyes:  Negative for visual disturbance.   Respiratory:  Negative for apnea (no PND), cough, choking, chest tightness, shortness of breath and wheezing.    Cardiovascular:  Negative for chest pain, palpitations and leg swelling.   Neurological:  Negative for dizziness, syncope, light-headedness and headaches.   Psychiatric/Behavioral:  The patient is nervous/anxious.    All other systems reviewed and are negative.      Patient Care Team:  Aguilar Rincon DO as PCP - General (Family Medicine)  Aguilar Rincon DO as PCP - Employee [employer] PCP  Brenda Cesar DO as Consulting Physician (Dermatology)    Objective   /90   Pulse 63   Ht 1.67 m (5' 5.75\")   Wt 122 kg (268 lb 4 oz)   LMP  (LMP Unknown)   SpO2 96%   BMI 43.63 kg/m²         Physical Exam  Vitals and nursing note reviewed.   Constitutional:       General: She is not in acute distress.     Appearance: Normal appearance.      Comments: No assistive device presently being used.   HENT:      Head: Normocephalic and atraumatic.   Eyes:      General: No scleral icterus.     Extraocular Movements: Extraocular movements intact.      Conjunctiva/sclera: Conjunctivae normal.   Pulmonary:      Effort: Pulmonary effort is normal. No respiratory distress.   Skin:     General: Skin is warm and dry.      Coloration: Skin is not jaundiced.   Neurological:      Mental Status: She is alert and oriented to person, place, and time. Mental status is at baseline.   Psychiatric:         Behavior: Behavior normal.         Assessment & Plan  Primary " hypertension  Marginal control here. Monitor at home.  Orders:    atenolol (Tenormin) 25 mg tablet; Take 1 tablet (25 mg) by mouth once daily.    Anxiety  Increase from 25 mg. Return 3 months.  Orders:    sertraline (Zoloft) 50 mg tablet; Take 1 tablet (50 mg) by mouth once daily.

## 2024-08-05 NOTE — ASSESSMENT & PLAN NOTE
Marginal control here. Monitor at home.  Orders:    atenolol (Tenormin) 25 mg tablet; Take 1 tablet (25 mg) by mouth once daily.

## 2024-08-05 NOTE — ASSESSMENT & PLAN NOTE
Increase from 25 mg. Return 3 months.  Orders:    sertraline (Zoloft) 50 mg tablet; Take 1 tablet (50 mg) by mouth once daily.

## 2024-08-16 ENCOUNTER — SPECIALTY PHARMACY (OUTPATIENT)
Dept: PHARMACY | Facility: CLINIC | Age: 39
End: 2024-08-16

## 2024-08-16 DIAGNOSIS — L40.0 PSORIASIS VULGARIS: ICD-10-CM

## 2024-08-16 RX ORDER — IXEKIZUMAB 80 MG/ML
80 INJECTION, SOLUTION SUBCUTANEOUS
Qty: 1 ML | Refills: 0 | Status: CANCELLED | OUTPATIENT
Start: 2024-08-16

## 2024-08-19 ENCOUNTER — SPECIALTY PHARMACY (OUTPATIENT)
Dept: PHARMACY | Facility: CLINIC | Age: 39
End: 2024-08-19

## 2024-08-19 DIAGNOSIS — L40.0 PSORIASIS VULGARIS: ICD-10-CM

## 2024-08-19 RX ORDER — IXEKIZUMAB 80 MG/ML
80 INJECTION, SOLUTION SUBCUTANEOUS
Qty: 1 ML | Refills: 0 | Status: SHIPPED | OUTPATIENT
Start: 2024-08-19

## 2024-08-20 PROCEDURE — RXMED WILLOW AMBULATORY MEDICATION CHARGE

## 2024-08-23 ENCOUNTER — PHARMACY VISIT (OUTPATIENT)
Dept: PHARMACY | Facility: CLINIC | Age: 39
End: 2024-08-23
Payer: COMMERCIAL

## 2024-09-19 ENCOUNTER — SPECIALTY PHARMACY (OUTPATIENT)
Dept: PHARMACY | Facility: CLINIC | Age: 39
End: 2024-09-19

## 2024-09-19 DIAGNOSIS — L40.0 PSORIASIS VULGARIS: ICD-10-CM

## 2024-09-19 RX ORDER — IXEKIZUMAB 80 MG/ML
80 INJECTION, SOLUTION SUBCUTANEOUS
Qty: 1 ML | Refills: 0 | Status: SHIPPED | OUTPATIENT
Start: 2024-09-19

## 2024-09-24 PROCEDURE — RXMED WILLOW AMBULATORY MEDICATION CHARGE

## 2024-09-26 ENCOUNTER — PHARMACY VISIT (OUTPATIENT)
Dept: PHARMACY | Facility: CLINIC | Age: 39
End: 2024-09-26
Payer: COMMERCIAL

## 2024-10-11 ENCOUNTER — SPECIALTY PHARMACY (OUTPATIENT)
Dept: PHARMACY | Facility: CLINIC | Age: 39
End: 2024-10-11

## 2024-10-11 DIAGNOSIS — L40.0 PSORIASIS VULGARIS: ICD-10-CM

## 2024-10-11 RX ORDER — IXEKIZUMAB 80 MG/ML
80 INJECTION, SOLUTION SUBCUTANEOUS
Qty: 1 ML | Refills: 0 | Status: SHIPPED | OUTPATIENT
Start: 2024-10-11

## 2024-10-15 PROCEDURE — RXMED WILLOW AMBULATORY MEDICATION CHARGE

## 2024-10-18 ENCOUNTER — SPECIALTY PHARMACY (OUTPATIENT)
Dept: PHARMACY | Facility: CLINIC | Age: 39
End: 2024-10-18

## 2024-10-19 ENCOUNTER — PHARMACY VISIT (OUTPATIENT)
Dept: PHARMACY | Facility: CLINIC | Age: 39
End: 2024-10-19
Payer: COMMERCIAL

## 2024-11-18 ENCOUNTER — SPECIALTY PHARMACY (OUTPATIENT)
Dept: PHARMACY | Facility: CLINIC | Age: 39
End: 2024-11-18

## 2024-11-18 DIAGNOSIS — L40.0 PSORIASIS VULGARIS: ICD-10-CM

## 2024-11-18 PROCEDURE — RXMED WILLOW AMBULATORY MEDICATION CHARGE

## 2024-11-18 RX ORDER — IXEKIZUMAB 80 MG/ML
80 INJECTION, SOLUTION SUBCUTANEOUS
Qty: 1 ML | Refills: 0 | Status: SHIPPED | OUTPATIENT
Start: 2024-11-18

## 2024-11-22 ENCOUNTER — SPECIALTY PHARMACY (OUTPATIENT)
Dept: PHARMACY | Facility: CLINIC | Age: 39
End: 2024-11-22

## 2024-11-25 ENCOUNTER — PHARMACY VISIT (OUTPATIENT)
Dept: PHARMACY | Facility: CLINIC | Age: 39
End: 2024-11-25
Payer: COMMERCIAL

## 2024-12-09 DIAGNOSIS — I10 PRIMARY HYPERTENSION: Primary | ICD-10-CM

## 2024-12-09 RX ORDER — LABETALOL 100 MG/1
50 TABLET, FILM COATED ORAL 2 TIMES DAILY
Qty: 100 TABLET | Refills: 3 | Status: SHIPPED | OUTPATIENT
Start: 2024-12-09

## 2024-12-16 ENCOUNTER — APPOINTMENT (OUTPATIENT)
Dept: OBSTETRICS AND GYNECOLOGY | Facility: CLINIC | Age: 39
End: 2024-12-16
Payer: COMMERCIAL

## 2024-12-16 ENCOUNTER — LAB (OUTPATIENT)
Dept: LAB | Facility: LAB | Age: 39
End: 2024-12-16
Payer: COMMERCIAL

## 2024-12-16 VITALS — WEIGHT: 269 LBS | DIASTOLIC BLOOD PRESSURE: 82 MMHG | SYSTOLIC BLOOD PRESSURE: 128 MMHG | BODY MASS INDEX: 43.75 KG/M2

## 2024-12-16 DIAGNOSIS — O36.80X0 PREGNANCY OF UNKNOWN ANATOMIC LOCATION (HHS-HCC): Primary | ICD-10-CM

## 2024-12-16 DIAGNOSIS — O36.80X0 PREGNANCY OF UNKNOWN ANATOMIC LOCATION (HHS-HCC): ICD-10-CM

## 2024-12-16 DIAGNOSIS — Z32.01 PREGNANCY TEST POSITIVE (HHS-HCC): ICD-10-CM

## 2024-12-16 LAB
B-HCG SERPL-ACNC: ABNORMAL MIU/ML
CRL: 13.8 MM
PREGNANCY TEST URINE, POC: POSITIVE

## 2024-12-16 PROCEDURE — 84702 CHORIONIC GONADOTROPIN TEST: CPT

## 2024-12-16 PROCEDURE — 76817 TRANSVAGINAL US OBSTETRIC: CPT | Performed by: OBSTETRICS & GYNECOLOGY

## 2024-12-16 PROCEDURE — 81025 URINE PREGNANCY TEST: CPT | Performed by: OBSTETRICS & GYNECOLOGY

## 2024-12-16 PROCEDURE — 0500F INITIAL PRENATAL CARE VISIT: CPT | Performed by: OBSTETRICS & GYNECOLOGY

## 2024-12-16 PROCEDURE — 36415 COLL VENOUS BLD VENIPUNCTURE: CPT

## 2024-12-16 NOTE — PROGRESS NOTES
Subjective   Patient ID [id number]   Sarahy Rehman is a 38 y.o.  who presents with a home UPT positive.  LMP unknown. Menses very irregular. Last menses was atypical and only two days. October.  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   Denies nausea.   Endorses little cramping.    Her pregnancy is complicated by:  - AMA  - Obesity - Pregravid BMI 43.75  - CHTN on labetalol  - H/o preeclampsia with severe features requiring iatrogenic PTD delivery at 34wks    OB History    Para Term  AB Living   2 1 0 1 0 1   SAB IAB Ectopic Multiple Live Births   0 0 0 0 1      # Outcome Date GA Lbr Anish/2nd Weight Sex Type Anes PTL Lv   2 Current            1  22 34w0d  2.381 kg M Vag-Spont EPI  JORDY          Objective   Physical Exam  Weight: 122 kg (269 lb)  Expected Total Weight Gain: 5 kg (11 lb)-9 kg (19 lb)   Pregravid BMI: 43.75  BP: 128/82          Physical Exam  Constitutional:       Appearance: Normal appearance.   HENT:      Head: Normocephalic and atraumatic.   Eyes:      Extraocular Movements: Extraocular movements intact.      Conjunctiva/sclera: Conjunctivae normal.      Pupils: Pupils are equal, round, and reactive to light.   Pulmonary:      Effort: Pulmonary effort is normal.   Abdominal:      General: Abdomen is flat.      Palpations: Abdomen is soft.   Genitourinary:     General: Normal vulva.      Vagina: Normal.      Cervix: Normal.      Uterus: Normal.       Adnexa: Right adnexa normal and left adnexa normal.   Skin:     General: Skin is warm and dry.   Neurological:      General: No focal deficit present.      Mental Status: She is alert.   Psychiatric:         Mood and Affect: Mood normal.         Behavior: Behavior normal.         Thought Content: Thought content normal.         Judgment: Judgment normal.          TVUS: intrauterine gestational sac versus pseudosac,16x11 mm. No fetal pole visualized. No yolk sac visualized. No adnexal masses visualized.      Assessment/Plan   Early  pregnancy, PUL  - beta HCG ordered, for repeat in 48 hours  - TVUS: Gestational sac versus pseudo sac measurements 16x11 mm. MSD 13.8mm.No IUP visualized. No yolk sac visualized. No adnexal masses visualized.  - RTC in 2 wks for repeat TVUS.  - pain and bleeding precautions reviewed    Scribe Attestation  By signing my name below, I, Trisha Gutierres, Scribe   attest that this documentation has been prepared under the direction and in the presence of John Burns MD.

## 2024-12-17 ENCOUNTER — HOSPITAL ENCOUNTER (OUTPATIENT)
Dept: RADIOLOGY | Facility: HOSPITAL | Age: 39
Discharge: HOME | End: 2024-12-17
Payer: COMMERCIAL

## 2024-12-17 DIAGNOSIS — I10 PRIMARY HYPERTENSION: ICD-10-CM

## 2024-12-17 DIAGNOSIS — O36.80X0 PREGNANCY OF UNKNOWN ANATOMIC LOCATION (HHS-HCC): ICD-10-CM

## 2024-12-17 DIAGNOSIS — O36.80X0 PREGNANCY OF UNKNOWN ANATOMIC LOCATION (HHS-HCC): Primary | ICD-10-CM

## 2024-12-17 PROCEDURE — 76815 OB US LIMITED FETUS(S): CPT | Performed by: STUDENT IN AN ORGANIZED HEALTH CARE EDUCATION/TRAINING PROGRAM

## 2024-12-17 PROCEDURE — 76817 TRANSVAGINAL US OBSTETRIC: CPT | Performed by: STUDENT IN AN ORGANIZED HEALTH CARE EDUCATION/TRAINING PROGRAM

## 2024-12-17 PROCEDURE — 76817 TRANSVAGINAL US OBSTETRIC: CPT

## 2024-12-17 RX ORDER — LABETALOL 200 MG/1
200 TABLET, FILM COATED ORAL 2 TIMES DAILY
Qty: 180 TABLET | Refills: 3 | Status: SHIPPED | OUTPATIENT
Start: 2024-12-17 | End: 2025-12-17

## 2024-12-21 ENCOUNTER — SPECIALTY PHARMACY (OUTPATIENT)
Dept: PHARMACY | Facility: CLINIC | Age: 39
End: 2024-12-21

## 2024-12-21 DIAGNOSIS — L40.0 PSORIASIS VULGARIS: ICD-10-CM

## 2024-12-23 ENCOUNTER — SPECIALTY PHARMACY (OUTPATIENT)
Dept: PHARMACY | Facility: CLINIC | Age: 39
End: 2024-12-23

## 2024-12-23 RX ORDER — IXEKIZUMAB 80 MG/ML
80 INJECTION, SOLUTION SUBCUTANEOUS
Qty: 1 ML | Refills: 0 | OUTPATIENT
Start: 2024-12-23

## 2024-12-30 NOTE — PROGRESS NOTES
Subjective   Patient ID [id number]   Sarahy Rehman is a 39 y.o.  who presents for a viability check with a home UPT positive.  No gestational sac or yolk sac could be seen at last visit on 24.  No ctx, VB or LOF.    Formal TVUS 24:   Viable IUP at 6w1d with CRL 4 mm,+FHT  Subchorionic hemorrhage: Present measuring 2.3 x 1.3 x 0.8 cm.     Hx 24:   LMP unknown. Menses very irregular. Last menses was atypical and only two days. October.  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   Denies nausea.   Endorses little cramping.    Her pregnancy is complicated by:  - AMA  - Obesity - Pregravid BMI 43.75  - CHTN on labetalol  - H/o preeclampsia with severe features requiring iatrogenic PTD delivery at 34wks    OB History    Para Term  AB Living   2 1 0 1 0 1   SAB IAB Ectopic Multiple Live Births   0 0 0 0 1      # Outcome Date GA Lbr Anish/2nd Weight Sex Type Anes PTL Lv   2 Current            1  22 34w0d  2.381 kg M Vag-Spont EPI  JORDY          Objective   Physical Exam  Weight: 122 kg (268 lb 3.2 oz)  Expected Total Weight Gain: 5 kg (11 lb)-9 kg (19 lb)   Pregravid BMI: 43.75  BP: 110/70      BSUS 24: Viable IUP at 8w3d with SHA of 2025 by US, CRL 17 mm, +yolk sac, +FHT, +FM, grossly normal fluid    Physical Exam  Constitutional:       Appearance: Normal appearance.   HENT:      Head: Normocephalic and atraumatic.   Eyes:      Extraocular Movements: Extraocular movements intact.      Conjunctiva/sclera: Conjunctivae normal.      Pupils: Pupils are equal, round, and reactive to light.   Pulmonary:      Effort: Pulmonary effort is normal.   Abdominal:      General: Abdomen is flat.      Palpations: Abdomen is soft.   Genitourinary:     General: Normal vulva.      Vagina: Normal.      Cervix: Normal.      Uterus: Normal.       Adnexa: Right adnexa normal and left adnexa normal.   Skin:     General: Skin is warm and dry.   Neurological:      General: No focal deficit present.       Mental Status: She is alert.   Psychiatric:         Mood and Affect: Mood normal.         Behavior: Behavior normal.         Thought Content: Thought content normal.         Judgment: Judgment normal.        Assessment/Plan   - Confirmed IUP at 8w1d GA by 6 wk US  - TVUS revealed viable IUP at 8 3/7 wks GA today with CRL 17 mm, +FHT, +FM, grossly normal fluid  - final SHA: 08/12/2025  - pregnancy complications: AMA, Obesity, CHTN on labetalol, H/o preeclampsia with severe features requiring iatrogenic PTD delivery at 34wks.  - for daily PNV  - prenatal labs ordered  - RTC in 2 wks for new OB visit    Scribe Attestation  By signing my name below, I, Trisha Gutierres, Scribe   attest that this documentation has been prepared under the direction and in the presence of John Burns MD.

## 2024-12-31 ENCOUNTER — LAB (OUTPATIENT)
Dept: LAB | Facility: LAB | Age: 39
End: 2024-12-31
Payer: COMMERCIAL

## 2024-12-31 ENCOUNTER — APPOINTMENT (OUTPATIENT)
Dept: OBSTETRICS AND GYNECOLOGY | Facility: CLINIC | Age: 39
End: 2024-12-31
Payer: COMMERCIAL

## 2024-12-31 VITALS — DIASTOLIC BLOOD PRESSURE: 70 MMHG | BODY MASS INDEX: 43.62 KG/M2 | SYSTOLIC BLOOD PRESSURE: 110 MMHG | WEIGHT: 268.2 LBS

## 2024-12-31 DIAGNOSIS — Z34.80 SUPERVISION OF OTHER NORMAL PREGNANCY, ANTEPARTUM (HHS-HCC): ICD-10-CM

## 2024-12-31 DIAGNOSIS — Z34.80 SUPERVISION OF OTHER NORMAL PREGNANCY, ANTEPARTUM (HHS-HCC): Primary | ICD-10-CM

## 2024-12-31 LAB
ABO GROUP (TYPE) IN BLOOD: NORMAL
ALT SERPL W P-5'-P-CCNC: 14 U/L (ref 7–45)
ANTIBODY SCREEN: NORMAL
AST SERPL W P-5'-P-CCNC: 14 U/L (ref 9–39)
CREAT SERPL-MCNC: 0.66 MG/DL (ref 0.5–1.05)
CREAT UR-MCNC: 105.8 MG/DL (ref 20–320)
EGFRCR SERPLBLD CKD-EPI 2021: >90 ML/MIN/1.73M*2
ERYTHROCYTE [DISTWIDTH] IN BLOOD BY AUTOMATED COUNT: 12.6 % (ref 11.5–14.5)
EST. AVERAGE GLUCOSE BLD GHB EST-MCNC: 108 MG/DL
HBA1C MFR BLD: 5.4 %
HCT VFR BLD AUTO: 39.8 % (ref 36–46)
HGB BLD-MCNC: 13.4 G/DL (ref 12–16)
HIV 1+2 AB+HIV1 P24 AG SERPL QL IA: NONREACTIVE
LDH SERPL L TO P-CCNC: 154 U/L (ref 84–246)
MCH RBC QN AUTO: 29 PG (ref 26–34)
MCHC RBC AUTO-ENTMCNC: 33.7 G/DL (ref 32–36)
MCV RBC AUTO: 86 FL (ref 80–100)
NRBC BLD-RTO: 0 /100 WBCS (ref 0–0)
PLATELET # BLD AUTO: 300 X10*3/UL (ref 150–450)
PROT UR-ACNC: 7 MG/DL (ref 5–24)
PROT/CREAT UR: 0.07 MG/MG CREAT (ref 0–0.17)
RBC # BLD AUTO: 4.62 X10*6/UL (ref 4–5.2)
REFLEX ADDED, ANEMIA PANEL: NORMAL
RH FACTOR (ANTIGEN D): NORMAL
URATE SERPL-MCNC: 4.8 MG/DL (ref 2.3–6.7)
WBC # BLD AUTO: 10.8 X10*3/UL (ref 4.4–11.3)

## 2024-12-31 PROCEDURE — 84156 ASSAY OF PROTEIN URINE: CPT

## 2024-12-31 PROCEDURE — 84450 TRANSFERASE (AST) (SGOT): CPT

## 2024-12-31 PROCEDURE — 83036 HEMOGLOBIN GLYCOSYLATED A1C: CPT

## 2024-12-31 PROCEDURE — 84460 ALANINE AMINO (ALT) (SGPT): CPT

## 2024-12-31 PROCEDURE — 86850 RBC ANTIBODY SCREEN: CPT

## 2024-12-31 PROCEDURE — 87340 HEPATITIS B SURFACE AG IA: CPT

## 2024-12-31 PROCEDURE — 86901 BLOOD TYPING SEROLOGIC RH(D): CPT

## 2024-12-31 PROCEDURE — 86900 BLOOD TYPING SEROLOGIC ABO: CPT

## 2024-12-31 PROCEDURE — 0500F INITIAL PRENATAL CARE VISIT: CPT | Performed by: OBSTETRICS & GYNECOLOGY

## 2024-12-31 PROCEDURE — 82570 ASSAY OF URINE CREATININE: CPT

## 2024-12-31 PROCEDURE — 82565 ASSAY OF CREATININE: CPT

## 2024-12-31 PROCEDURE — 86780 TREPONEMA PALLIDUM: CPT

## 2024-12-31 PROCEDURE — 85027 COMPLETE CBC AUTOMATED: CPT

## 2024-12-31 PROCEDURE — 83615 LACTATE (LD) (LDH) ENZYME: CPT

## 2024-12-31 PROCEDURE — 86803 HEPATITIS C AB TEST: CPT

## 2024-12-31 PROCEDURE — 84550 ASSAY OF BLOOD/URIC ACID: CPT

## 2024-12-31 PROCEDURE — 87389 HIV-1 AG W/HIV-1&-2 AB AG IA: CPT

## 2024-12-31 PROCEDURE — 86317 IMMUNOASSAY INFECTIOUS AGENT: CPT

## 2025-01-01 LAB
HBV SURFACE AG SERPL QL IA: NONREACTIVE
HCV AB SER QL: NONREACTIVE
RUBV IGG SERPL IA-ACNC: 1.3 IA
RUBV IGG SERPL QL IA: POSITIVE
TREPONEMA PALLIDUM IGG+IGM AB [PRESENCE] IN SERUM OR PLASMA BY IMMUNOASSAY: NONREACTIVE

## 2025-01-02 PROBLEM — Z34.90 ENCOUNTER FOR SUPERVISION OF NORMAL PREGNANCY, ANTEPARTUM: Status: ACTIVE | Noted: 2025-01-02

## 2025-01-09 ENCOUNTER — DOCUMENTATION (OUTPATIENT)
Dept: OBSTETRICS AND GYNECOLOGY | Facility: CLINIC | Age: 40
End: 2025-01-09
Payer: COMMERCIAL

## 2025-01-09 PROBLEM — O09.529 ANTEPARTUM MULTIGRAVIDA OF ADVANCED MATERNAL AGE (HHS-HCC): Status: ACTIVE | Noted: 2025-01-09

## 2025-01-09 PROBLEM — Z87.59 HISTORY OF PRE-ECLAMPSIA: Status: ACTIVE | Noted: 2025-01-09

## 2025-01-09 PROBLEM — Z34.80 SUPERVISION OF OTHER NORMAL PREGNANCY, ANTEPARTUM (HHS-HCC): Status: ACTIVE | Noted: 2025-01-02

## 2025-01-15 ENCOUNTER — APPOINTMENT (OUTPATIENT)
Dept: DERMATOLOGY | Facility: CLINIC | Age: 40
End: 2025-01-15
Payer: COMMERCIAL

## 2025-01-15 ENCOUNTER — APPOINTMENT (OUTPATIENT)
Dept: LAB | Facility: LAB | Age: 40
End: 2025-01-15
Payer: COMMERCIAL

## 2025-01-15 ENCOUNTER — ROUTINE PRENATAL (OUTPATIENT)
Dept: OBSTETRICS AND GYNECOLOGY | Facility: CLINIC | Age: 40
End: 2025-01-15
Payer: COMMERCIAL

## 2025-01-15 VITALS — BODY MASS INDEX: 43.59 KG/M2 | DIASTOLIC BLOOD PRESSURE: 74 MMHG | SYSTOLIC BLOOD PRESSURE: 120 MMHG | WEIGHT: 268 LBS

## 2025-01-15 DIAGNOSIS — O99.210 OBESITY IN PREGNANCY (HHS-HCC): ICD-10-CM

## 2025-01-15 DIAGNOSIS — O10.919 CHRONIC HYPERTENSION IN PREGNANCY (HHS-HCC): ICD-10-CM

## 2025-01-15 DIAGNOSIS — Z34.80 SUPERVISION OF OTHER NORMAL PREGNANCY, ANTEPARTUM (HHS-HCC): Primary | ICD-10-CM

## 2025-01-15 DIAGNOSIS — O09.529 ANTEPARTUM MULTIGRAVIDA OF ADVANCED MATERNAL AGE (HHS-HCC): ICD-10-CM

## 2025-01-15 DIAGNOSIS — Z36.9 FIRST TRIMESTER SCREENING (HHS-HCC): ICD-10-CM

## 2025-01-15 DIAGNOSIS — Z12.4 SCREENING FOR CERVICAL CANCER: ICD-10-CM

## 2025-01-15 DIAGNOSIS — Z87.59 HISTORY OF PRE-ECLAMPSIA: ICD-10-CM

## 2025-01-15 PROBLEM — O99.810 IMPAIRED GLUCOSE TOLERANCE DURING PREGNANCY (HHS-HCC): Status: RESOLVED | Noted: 2022-10-21 | Resolved: 2025-01-15

## 2025-01-15 PROBLEM — O09.899 HISTORY OF PRETERM DELIVERY, CURRENTLY PREGNANT (HHS-HCC): Status: ACTIVE | Noted: 2025-01-15

## 2025-01-15 PROCEDURE — 87491 CHLMYD TRACH DNA AMP PROBE: CPT

## 2025-01-15 PROCEDURE — 87086 URINE CULTURE/COLONY COUNT: CPT

## 2025-01-15 PROCEDURE — 88175 CYTOPATH C/V AUTO FLUID REDO: CPT

## 2025-01-15 PROCEDURE — 87591 N.GONORRHOEAE DNA AMP PROB: CPT

## 2025-01-15 PROCEDURE — 0501F PRENATAL FLOW SHEET: CPT | Performed by: OBSTETRICS & GYNECOLOGY

## 2025-01-15 PROCEDURE — 87624 HPV HI-RISK TYP POOLED RSLT: CPT

## 2025-01-15 NOTE — PROGRESS NOTES
Ob Visit  01/15/25     SUBJECTIVE    HPI: Sarahy Rehman is a 39 y.o.  at 10w2d here for RPNV.      She has been feeling ok. Still fairly tired.   But, no nausea or vomiting, eating ok.  No pelvic pain or bleeding.    OBJECTIVE  Visit Vitals  /74   Wt 122 kg (268 lb)   LMP  (LMP Unknown)   BMI 43.59 kg/m²   OB Status Pregnant   Smoking Status Former   BSA 2.38 m²        Lab Results   Component Value Date    HGB 13.4 2024    HCT 39.8 2024       Physical Exam:   Weight: 122 kg (268 lb)  Expected Total Weight Gain: 5 kg (11 lb)-9 kg (19 lb)   Pregravid BMI: 43.75  BP: 120/74  Fetal Heart Rate: +US      Dilation: Closed Effacement (%): 0      TVUS: Single, viable IUP. Fetus measures 10w3d by CRL. Normal FHR    Constitutional: Alert and in no acute distress.     Heart: regular rate and rhythm    Pulmonary: Lungs clear. No respiratory distress.     Chest: Breasts: Normal appearance, no nipple discharge, and no skin changes. Palpation of breasts and axillae: No palpable mass and no axillary lymphadenopathy.    Abdomen: Soft, nontender; no abdominal mass palpated.     Genitourinary:   External genitalia: Normal appearance.  No inguinal lymphadenopathy.   Bartholin's, Urethral, and Skenes Glands: Normal.   Urethra: Normal. Bladder: Normal on palpation.   Vagina: Normal.   Cervix: Normal appearance, nontender.   Uterus/Adnexa: Nontender, no masses palpated in adnexa  Inspection of perianal area: Normal.    Musculoskeletal: No joint swelling seen, normal movements of all extremities.    Skin: Normal skin color and pigmentation, normal skin turgor, and no rash.    Psychiatric: Alert and oriented x 3. Affect normal to patient's baseline. Mood: Appropriate.    ASSESSMENT & PLAN    Sarahy Rehman is a 39 y.o.  at 10w2d here for the following concerns we addressed today:    Problem List Items Addressed This Visit       Chronic hypertension in pregnancy ([facility])    Overview     Meds: Labetalol  [x]   Baseline HELLP labs negative  [x]  Baseline UPCr 0.07  [x]   mg/day starting at 12 weeks GA - advised 1/15/24  []  MFM consult (if complicated/appropriate)  []  US for growth (/36 wks if meds, 3036 no meds)  []  NST/BPP weekly starting at 32 weeks (meds)    Delivery recommended: 90j9o-99t7c no meds, 07n9l-02b0o meds           Obesity in pregnancy ([facility])    Overview     [x]  BMI at ENOB 43.62  [x]  Baseline A1c 5.4/108  []  NST: weekly at 34 weeks (BMI 40+ at first visit)/36 weeks (BMI 35-39.9 at first visit)  []  Growth US 30 and 36 wks  If BMI 50+ at any point in pregnancy, delivery at [facility]  Delivery recommended 39w0d to 39w6d             Supervision of other normal pregnancy, antepartum ([facility]) - Primary    Overview         1st Trimester  [x]  OB profile/lab work 24 NL, BLOOD TYPE IS O+  [x]  Pap - NEG HPV NEG  []  GC DNA probe 1-15-25  []  Urine culture 1-15-25  [x]  Early A1c (BMI 30+) 24 5.4/108  [x]  Aneuploidy screening (Prequel 10+ wk/First Trimester Check 11-14 wk) 1/15/25  [x]  Carrier screening 1/15/25  [x]  First trimester anatomy US/NT - ordered 1/15/25    2nd Trimester  []  Anatomy US (19-21 wks)  []  1 hour Glucose (25-28 wks)  []  CBC (25-28 wks)  []  3 hour GTT (if needed)    3rd Trimester  []  GBS (36-37 wks)  []  L&D consent  []  TOLAC consent (if needed)    Vaccines  [x]  COVID 2021 x3, 2022 x1  []  Flu (September to May)  []  Tdap (27-36 wks)  []  RSV (32-36 wks Sept-)             Relevant Orders    Urine culture    THINPREP PAP TEST    Myriad Foresight Carrier Screen    Myriad Prequel Prenatal Screen    Antepartum multigravida of advanced maternal age ([facility])    Overview     [x]  Aneuploidy screening offered - Prequel NIPT ordered 1/15/25  []  NST weekly at 36 weeks (40+)  []   US for growth 30, 36 weeks (40+)    Delivery recommended: 43y1o-45u9y           Relevant Orders    US MAC OB imaging order    History of pre-eclampsia    Overview      Superimposed sPEC in G1.   Delivery at 34 weeks.  Baseline PIH labs - normal; UPC 0.07          Other Visit Diagnoses       Screening for cervical cancer        Relevant Orders    THINPREP PAP TEST    First trimester screening ([facility])        Relevant Orders    US MAC OB imaging order                Doing well today.  Physical exam normal.  Routine prenatal labs done and normal.  Pap testing ordered.   Urine culture, STD testing sent.  BP normal today. She will continue Labetalol management. Baseline HELLP labs normal.  Advised her to start baby ASA at 12 weeks.  Prequel NIPT ordered today.   Formal first trimester ultrasound ordered.  Precautions reviewed. Advised her to call for any new problems.    RTC in 4 weeks      Ivonne Merlos MD

## 2025-01-16 LAB
BACTERIA UR CULT: NORMAL
C TRACH RRNA SPEC QL NAA+PROBE: NEGATIVE
N GONORRHOEA DNA SPEC QL PROBE+SIG AMP: NEGATIVE

## 2025-01-22 LAB
COMMENTS - MP RESULT TYPE: NORMAL
SCAN RESULT: NORMAL

## 2025-01-28 LAB
CYTOLOGY CMNT CVX/VAG CYTO-IMP: NORMAL
HPV HR 12 DNA GENITAL QL NAA+PROBE: NEGATIVE
HPV HR GENOTYPES PNL CVX NAA+PROBE: NEGATIVE
HPV16 DNA SPEC QL NAA+PROBE: NEGATIVE
HPV18 DNA SPEC QL NAA+PROBE: NEGATIVE
LAB AP HPV GENOTYPE QUESTION: YES
LAB AP HPV HR: NORMAL
LAB AP PAP ADDITIONAL TESTS: NORMAL
LABORATORY COMMENT REPORT: NORMAL
MENSTRUAL HX REPORTED: NORMAL
PATH REPORT.TOTAL CANCER: NORMAL

## 2025-01-31 LAB
COMMENTS - MP RESULT TYPE: NORMAL
SCAN RESULT: NORMAL

## 2025-02-05 ENCOUNTER — APPOINTMENT (OUTPATIENT)
Dept: PRIMARY CARE | Facility: CLINIC | Age: 40
End: 2025-02-05
Payer: COMMERCIAL

## 2025-02-06 ENCOUNTER — HOSPITAL ENCOUNTER (OUTPATIENT)
Dept: RADIOLOGY | Facility: CLINIC | Age: 40
Discharge: HOME | End: 2025-02-06
Payer: COMMERCIAL

## 2025-02-06 DIAGNOSIS — O09.529 ANTEPARTUM MULTIGRAVIDA OF ADVANCED MATERNAL AGE (HHS-HCC): ICD-10-CM

## 2025-02-06 DIAGNOSIS — Z36.9 FIRST TRIMESTER SCREENING (HHS-HCC): ICD-10-CM

## 2025-02-06 DIAGNOSIS — O09.521 ADVANCED MATERNAL AGE IN MULTIGRAVIDA, FIRST TRIMESTER (HHS-HCC): ICD-10-CM

## 2025-02-06 DIAGNOSIS — Z36.89 ESTABLISH GESTATIONAL AGE, ULTRASOUND: ICD-10-CM

## 2025-02-06 DIAGNOSIS — Z36.82 ENCOUNTER FOR NUCHAL TRANSLUCENCY TESTING (HHS-HCC): ICD-10-CM

## 2025-02-06 PROCEDURE — 76801 OB US < 14 WKS SINGLE FETUS: CPT

## 2025-02-14 ENCOUNTER — APPOINTMENT (OUTPATIENT)
Dept: OBSTETRICS AND GYNECOLOGY | Facility: CLINIC | Age: 40
End: 2025-02-14
Payer: COMMERCIAL

## 2025-02-14 VITALS — DIASTOLIC BLOOD PRESSURE: 92 MMHG | SYSTOLIC BLOOD PRESSURE: 142 MMHG | BODY MASS INDEX: 44.07 KG/M2 | WEIGHT: 271 LBS

## 2025-02-14 DIAGNOSIS — Z34.80 SUPERVISION OF OTHER NORMAL PREGNANCY, ANTEPARTUM (HHS-HCC): Primary | ICD-10-CM

## 2025-02-14 DIAGNOSIS — O10.919 CHRONIC HYPERTENSION IN PREGNANCY (HHS-HCC): ICD-10-CM

## 2025-02-14 DIAGNOSIS — O09.899 HISTORY OF PRETERM DELIVERY, CURRENTLY PREGNANT (HHS-HCC): ICD-10-CM

## 2025-02-14 DIAGNOSIS — O09.529 ANTEPARTUM MULTIGRAVIDA OF ADVANCED MATERNAL AGE (HHS-HCC): ICD-10-CM

## 2025-02-14 DIAGNOSIS — I10 PRIMARY HYPERTENSION: ICD-10-CM

## 2025-02-14 DIAGNOSIS — O99.210 OBESITY IN PREGNANCY (HHS-HCC): ICD-10-CM

## 2025-02-14 DIAGNOSIS — Z87.59 HISTORY OF PRE-ECLAMPSIA: ICD-10-CM

## 2025-02-14 LAB
POC BLOOD, URINE: NEGATIVE
POC GLUCOSE, URINE: NEGATIVE MG/DL
POC KETONES, URINE: NEGATIVE MG/DL
POC LEUKOCYTES, URINE: NEGATIVE
POC NITRITE,URINE: NEGATIVE
POC PROTEIN, URINE: NEGATIVE MG/DL

## 2025-02-14 RX ORDER — LABETALOL 200 MG/1
300 TABLET, FILM COATED ORAL 2 TIMES DAILY
Qty: 270 TABLET | Refills: 3 | Status: SHIPPED | OUTPATIENT
Start: 2025-02-14 | End: 2026-02-14

## 2025-02-14 NOTE — PROGRESS NOTES
Sarahy Rehman is a  at 14w4d with a working estimated date of delivery of 2025, by Ultrasound who presents for a routine prenatal visit.   She is feeling well. Feeling some fetal flutters.  Has received her flu vaccine this season.  Has anatomy US scheduled.     Pregnancy c/b:  CHTN in pregnancy: Has been checking BP at home and they have been consistently in the 140s/90s range. Taking Labetalol 200 mg twice daily. Taking aspiring prophylaxis as well.   Obesity: Pregravid BMI 43  AMA: rrNIPT, XY  H/O PTB  H/O PEC    Objective   Physical Exam  Weight: 123 kg (271 lb)  Expected Total Weight Gain: 5 kg (11 lb)-9 kg (19 lb)   Pregravid BMI: 43.75  BP: (!) 142/92  Fetal Heart Rate: 155 Fundal Height (cm):  (BU)    Problem List Items Addressed This Visit          Ob-Gyn Problems    Supervision of other normal pregnancy, antepartum ([facility]) - Primary    Overview         1st Trimester  [x]  OB profile/lab work 24 NL, BLOOD TYPE IS O+  [x]  Pap 1-15-25  NEG HPV NEG  [x]  GC DNA probe 1-15-25 NEG/NEG  [x]  Urine culture 1-15-25 NEG  [x]  Early A1c (BMI 30+) 24 5.4/108  [x]  Aneuploidy screening (Prequel 10+ wk/First Trimester Check 11-14 wk) 1/15/25 PREQUEL NEG  [x]  Carrier screening 1/15/25 FORESIGHT NEG  [x]  First trimester anatomy US/NT - 25: 13w2d, CRL c/w SHA, normal organ survey, NT subjectively normal.     2nd Trimester  []  Anatomy US (19-21 wks)  []  1 hour Glucose (25-28 wks)  []  CBC (25-28 wks)  []  3 hour GTT (if needed)    3rd Trimester  []  GBS (36-37 wks)  []  L&D consent  []  TOLAC consent (if needed)    Vaccines  [x]  COVID 2021 x3, 2022 x1  [x]  Flu (September to May) 2024  []  Tdap (27-36 wks)  []  RSV (32-36 wks Sept-Oscar)             Relevant Orders    POCT UA Automated manually resulted (Completed)    Obesity in pregnancy ([facility])    Overview     [x]  BMI at ENOB 43.62  [x]  Baseline A1c 5.4/108  []  NST: weekly at 34 weeks (BMI 40+ at first visit)/36  weeks (BMI 35-39.9 at first visit)  []  Growth US 30 and 36 wks  If BMI 50+ at any point in pregnancy, delivery at [facility]  Delivery recommended 39w0d to 39w6d             History of  delivery, currently pregnant ([facility])    Overview     34 week delivery in G1  IOL for CHTN with superimposed PEC         History of pre-eclampsia    Overview     Superimposed sPEC in G1.   Delivery at 34 weeks.  Baseline PIH labs - normal; UPC 0.07         Antepartum multigravida of advanced maternal age ([facility])    Overview     [x]  Aneuploidy screening offered - Prequel NIPT ordered 1/15/25  []  NST weekly at 36 weeks (40+)  []   US for growth 30, 36 weeks (40+)    Delivery recommended: 79k6w-96k8h              Other    Chronic hypertension in pregnancy ([facility])    Overview     Meds: Labetalol  [x]  Baseline HELLP labs negative  [x]  Baseline UPCr 0.07  [x]   mg/day starting at 12 weeks GA Taking as advised  [x]  MFM consult (if complicated/appropriate) Referral placed   []  US for growth (28/30/36 wks if meds, 3036 no meds)  []  NST/BPP weekly starting at 32 weeks (meds)    Delivery recommended: 63o0n-36s1j no meds, 28f1h-85z4p meds           Relevant Medications    labetalol (Normodyne) 200 mg tablet    Other Relevant Orders    Referral to Maternal Fetal Medicine     Other Visit Diagnoses       Primary hypertension        Relevant Medications    labetalol (Normodyne) 200 mg tablet             CHTN on meds: Plan to increase Labetalol to 300 mg twice daily. Check BP twice daily, send in logs after 3 days of monitoring.  Referral placed to MFM.   Precautions given  Follow up in 4 weeks for a routine prenatal visit or sooner if needed.

## 2025-02-17 DIAGNOSIS — F41.9 ANXIETY: ICD-10-CM

## 2025-02-20 RX ORDER — SERTRALINE HYDROCHLORIDE 50 MG/1
50 TABLET, FILM COATED ORAL DAILY
Qty: 100 TABLET | Refills: 1 | Status: SHIPPED | OUTPATIENT
Start: 2025-02-20

## 2025-02-20 NOTE — TELEPHONE ENCOUNTER
Patient schedule for 8/25. She is pregnant, so her OB has been handling her blood pressure medication.

## 2025-03-03 ENCOUNTER — APPOINTMENT (OUTPATIENT)
Dept: MATERNAL FETAL MEDICINE | Facility: CLINIC | Age: 40
End: 2025-03-03
Payer: COMMERCIAL

## 2025-03-03 ENCOUNTER — INITIAL PRENATAL (OUTPATIENT)
Dept: MATERNAL FETAL MEDICINE | Facility: CLINIC | Age: 40
End: 2025-03-03
Payer: COMMERCIAL

## 2025-03-03 VITALS — SYSTOLIC BLOOD PRESSURE: 128 MMHG | DIASTOLIC BLOOD PRESSURE: 80 MMHG | WEIGHT: 265.2 LBS | BODY MASS INDEX: 43.13 KG/M2

## 2025-03-03 DIAGNOSIS — Z87.59 HISTORY OF PRE-ECLAMPSIA: ICD-10-CM

## 2025-03-03 DIAGNOSIS — O09.522 AMA (ADVANCED MATERNAL AGE) MULTIGRAVIDA 35+, SECOND TRIMESTER (HHS-HCC): ICD-10-CM

## 2025-03-03 DIAGNOSIS — O99.212 OBESITY AFFECTING PREGNANCY IN SECOND TRIMESTER, UNSPECIFIED OBESITY TYPE (HHS-HCC): Primary | ICD-10-CM

## 2025-03-03 DIAGNOSIS — O10.919 CHRONIC HYPERTENSION IN PREGNANCY (HHS-HCC): ICD-10-CM

## 2025-03-03 PROCEDURE — 99214 OFFICE O/P EST MOD 30 MIN: CPT | Performed by: OBSTETRICS & GYNECOLOGY

## 2025-03-03 PROCEDURE — 99244 OFF/OP CNSLTJ NEW/EST MOD 40: CPT | Performed by: OBSTETRICS & GYNECOLOGY

## 2025-03-03 NOTE — PROGRESS NOTES
Sarahy Rehman is a 39 y.o.  @ 17w0d presenting for an MFM consultation from [facility] due to cHTN with history of SiPEC, AMA and class III obesity. She is without complaints today. ROS is negative.      Issues:   Chronic HTN- currently on labetalol 300mg BID. Baseline PEC labs normal. P:C 0.07. Prior 34 week delivery due to PEC.   AMA- risk reducing cffDNA.   Class III obesity- A1c 5.4%.     OBHx:  x 1 at 34 weeks   GynHx: remote history of abnormal paps  MedHx: cHTN, obesity, psoriasis, anxiety  SurgHx: gallbladder, leg ablations for edema, wisdom teeth, lasik  Meds: PNV, labetalol, baby ASA, zoloft  All: valium, reglan  FamHx: noncontributory   SocHx: no toxic habits     O: Visit Vitals  /80   Wt 120 kg (265 lb 3.2 oz)   LMP  (LMP Unknown)   BMI 43.13 kg/m²   OB Status Pregnant   Smoking Status Former   BSA 2.36 m²      Physical exam deferred for this consultative visit    A/P: 40yo  @ 17w presenting for an MFM consultation. We discussed the following:   Chronic HTN with history of  PEC  She has been counseled on the implications of cHTN during pregnancy including the increased risk of  delivery, placental abruption, pre-eclampsia/HELLP, FGR,  NICU admission and stillbirth.  We discussed that antihypertensive medications cross the placenta but are generally considered safe with first line medications being labetalol and nifedipine due to their efficacy and safety data.      Generally, blood pressure targets are <140 systolic and <90 diastolic.   We discussed that due to the physiologic decrease in systemic vascular resistance during pregnancy, blood pressure often is lower in the first and second trimesters before returning to their baseline values in the third trimester and that blood pressure medications often need to be titrated as gestation advanced.  However careful attention should be paid to the potential development of pre-eclampsia if increased  doses are required.      We discussed that we recommend obtaining baseline pre-eclampsia labs as well as serial growth ultrasounds to screen for FGR.  If oral antihypertensive is required we recommend  testing with twice weekly NSTs to start at 32 weeks.  We recommend delivery at 38-39 weeks for patients with cHTN that is well controlled without medication, 37-38 in patients well controlled on medication and delivery at 37 weeks in patients that are poorly controlled. Finally we discussed the use of baby aspirin for preeclampsia prophylaxis.     2. AMA  We discussed the implications of advanced maternal age on pregnancy.  Specifically we discussed the increased risk of aneuploidy. She has risk reducing cffDNA. We also discussed the increased risk of GDM in this pregnancy.     3. Class III obesity  Today we discussed the pregnancy implications including increased risk of pre-eclampsia, gestational diabetes,  delivery, LGA/macrosomia, growth restriction, stillbirth, shoulder dystocia, venous thromboembolic disease and congenital anomalies.  We reviewed that modest weight loss of even 5% of total body weight is associated with a decrease in these risks.  We also discussed that preconception BMI appears to more predictive of adverse pregnancy outcome than gestational weight gain and that it is unclear what is optimal weight gain in pregnancy in women with obesity, though weight loss is discouraged during pregnancy.  We reviewed the [place] [employer] recommendations for gestational weight gain obese women, though subsequent large studies have suggestive that lower weight gain may be associated with lower rates of maternal and fetal morbidity and that we recommend that she limit her weight gain to no more than 11-20lbs during pregnancy and that weight gain less than 11-20 lbs is also acceptable assuming normal fetal growth.  To address some of these concerns we discussed increased pregnancy  surveillance to include an early screen for diabetes, detailed anatomic evaluation of the fetus and serial growth ultrasound to assess fetal weight.      Thank you for allowing me to participate in the care of your patient. Please do not hesitate to contact me with any further questions. In brief, she will continue prenatal care with the [facility] and have imaging with [facility]. Plan for serial fetal growth scans in the 3rd trimester and fetal surveillance to begin at 32 weeks as above.     Tati Ford MD  Maternal Fetal Medicine  Director of Fetal Intervention

## 2025-03-13 DIAGNOSIS — L40.9 PSORIASIS: Primary | ICD-10-CM

## 2025-03-13 RX ORDER — CLOBETASOL PROPIONATE 0.5 MG/G
CREAM TOPICAL 2 TIMES DAILY
Qty: 60 G | Refills: 3 | Status: SHIPPED | OUTPATIENT
Start: 2025-03-13 | End: 2025-03-27

## 2025-03-14 ENCOUNTER — APPOINTMENT (OUTPATIENT)
Dept: OBSTETRICS AND GYNECOLOGY | Facility: CLINIC | Age: 40
End: 2025-03-14
Payer: COMMERCIAL

## 2025-03-14 VITALS — WEIGHT: 269 LBS | SYSTOLIC BLOOD PRESSURE: 130 MMHG | DIASTOLIC BLOOD PRESSURE: 86 MMHG | BODY MASS INDEX: 43.75 KG/M2

## 2025-03-14 DIAGNOSIS — Z34.80 SUPERVISION OF OTHER NORMAL PREGNANCY, ANTEPARTUM (HHS-HCC): ICD-10-CM

## 2025-03-14 PROBLEM — Z85.820 HISTORY OF MALIGNANT MELANOMA: Status: RESOLVED | Noted: 2022-01-19 | Resolved: 2025-03-14

## 2025-03-14 PROBLEM — R60.0 EDEMA OF LOWER EXTREMITY: Status: RESOLVED | Noted: 2024-08-02 | Resolved: 2025-03-14

## 2025-03-14 PROBLEM — C44.519 BASAL CELL CARCINOMA (BCC) OF BACK: Status: RESOLVED | Noted: 2022-01-19 | Resolved: 2025-03-14

## 2025-03-14 PROBLEM — I99.8 VASCULAR INSUFFICIENCY: Status: RESOLVED | Noted: 2024-08-02 | Resolved: 2025-03-14

## 2025-03-14 PROBLEM — R93.5 ABNORMAL COMPUTERIZED AXIAL TOMOGRAPHY OF ABDOMEN: Status: RESOLVED | Noted: 2022-11-21 | Resolved: 2025-03-14

## 2025-03-14 PROBLEM — Z86.79 HISTORY OF HYPERTENSION: Status: RESOLVED | Noted: 2024-08-02 | Resolved: 2025-03-14

## 2025-03-14 NOTE — PATIENT INSTRUCTIONS
You were seen in the office today for routine OB care and had normal findings on exam  Continue routine OB precautions at home  Continue taking prenatal vitamins   Avoid sick contacts and consider getting your Flu (available in office during flu season) and COVID vaccines to protect against infection in pregnancy  You will be given an order for your anatomy ultrasound. Please schedule at [facility] OB imaging between 19 and 22 weeks gestation [phone]  You will be given a bottle of Glucola and orders for your second trimester labs. Please complete these between 25 and 28 weeks gestation. You may complete these at any  outpatient lab, and do not need an appointment  Make an appointment for routine care in the office in the next 4 weeks  If you are having any concerns prior to your next visit please call the office to speak to the physician on call. This includes after hours, weekends, and holidays, when the answering service will be able to connect you with the physician on call. [phone] ([place] Office) or [phone] [place] [facility].

## 2025-03-14 NOTE — PROGRESS NOTES
Subjective   Patient ID [id number]   Sarahy Rehman is a 39 y.o.  at 18w4d with a working estimated date of delivery of 2025, by Ultrasound who presents for a routine prenatal visit. She denies vaginal bleeding, leakage of fluid, decreased fetal movements, or contractions.    Her pregnancy is complicated by:  CHTN  Hx preeclampsia  Anxiety  Obesity  AMA    Objective   Physical Exam  Weight: 122 kg (269 lb)  Expected Total Weight Gain: 5 kg (11 lb)-9 kg (19 lb)   Pregravid BMI: 43.75  BP: 130/86         Prenatal Labs  Urine dip:  Lab Results   Component Value Date    KETONESU NEGATIVE 2025       Lab Results   Component Value Date    HGB 13.4 2024    HCT 39.8 2024    ABO O 2024    HEPBSAG Nonreactive 2024           Imaging: anatomy US 2 weeks    Assessment/Plan   Problem List Items Addressed This Visit             ICD-10-CM    Supervision of other normal pregnancy, antepartum ([facility]) Z34.80    Relevant Orders    POCT UA Automated manually resulted (Completed)     Medical Problems       Problem List       Supervision of other normal pregnancy, antepartum ([facility])    Overview Addendum 3/14/2025  4:29 PM by Rosey Whitley, DO         1st Trimester  [x]  OB profile/lab work 24 NL, BLOOD TYPE IS O+  [x]  Pap 1-15-25  NEG HPV NEG  [x]  GC DNA probe 1-15-25 NEG/NEG  [x]  Urine culture 1-15-25 NEG  [x]  Early A1c (BMI 30+) 24 5.4/108  [x]  Aneuploidy screening (Prequel 10+ wk/First Trimester Check 11-14 wk) 1/15/25 PREQUEL NEG  [x]  Carrier screening 1/15/25 FORESIGHT NEG  [x]  First trimester anatomy US/NT - 25: 13w2d, CRL c/w SHA, normal organ survey, NT subjectively normal.     2nd Trimester  []  Anatomy US (19-21 wks) SCHEDULED 3/27  []  1 hour Glucose (25-28 wks)  []  CBC (25-28 wks)  []  3 hour GTT (if needed)    3rd Trimester  []  GBS (36-37 wks)  []  L&D consent  []  TOLAC consent (if needed)    Vaccines  [x]  COVID 2021 x3, 2022 x1  [x]  Flu  (September to May) 2024  []  Tdap (27-36 wks)  []  RSV (32-36 wks Sept-)             Chronic hypertension in pregnancy ([facility])    Overview Addendum 2025  3:58 PM by BRADEN Montes De Oca-CNP     Meds: Labetalol  [x]  Baseline HELLP labs negative  [x]  Baseline UPCr 0.07  [x]   mg/day starting at 12 weeks GA Taking as advised  [x]  MFM consult (if complicated/appropriate) Referral placed   []  US for growth (28/30/36 wks if meds, 30/36 no meds)  []  NST/BPP weekly starting at 32 weeks (meds)    Delivery recommended: 22z6x-30x5y no meds, 54v5p-26f9g meds           Psoriasis    Anxiety    Overview Signed 3/14/2025  4:32 PM by Rosey Whitley, DO     Stable on Zoloft, monitor closely for acute worsening         Obesity in pregnancy ([facility])    Overview Signed 2025 10:20 AM by Lisa Ravi RN     [x]  BMI at ENOB 43.62  [x]  Baseline A1c 5.4/108  []  NST: weekly at 34 weeks (BMI 40+ at first visit)/36 weeks (BMI 35-39.9 at first visit)  []  Growth US 30 and 36 wks  If BMI 50+ at any point in pregnancy, [place] [facility]  Delivery recommended 39w0d to 39w6d             Latent tuberculosis    Antepartum multigravida of advanced maternal age ([facility])    Overview Addendum 1/15/2025  4:23 PM by Ivonne Merlos MD     [x]  Aneuploidy screening offered - Prequel NIPT ordered 1/15/25  []  NST weekly at 36 weeks (40+)  []   US for growth 30, 36 weeks (40+)    Delivery recommended: 20p1c-33x3w           History of pre-eclampsia    Overview Addendum 1/15/2025  9:59 AM by Ivonne Merlos MD     Superimposed sPEC in G1.   Delivery at 34 weeks.  Baseline PIH labs - normal; UPC 0.07         History of  delivery, currently pregnant ([facility])    Overview Signed 1/15/2025 10:01 AM by Ivonne Merlos MD     34 week delivery in G1  IOL for CHTN with superimposed PEC                 Continue prenatal vitamin.  Labs reviewed.  Rhogam Not indicated  GTT 25-28 weeks.    Follow up in  4 weeks for a routine prenatal visit.  Rosey Whitley DO

## 2025-03-21 ENCOUNTER — PATIENT MESSAGE (OUTPATIENT)
Dept: OBSTETRICS AND GYNECOLOGY | Facility: CLINIC | Age: 40
End: 2025-03-21
Payer: COMMERCIAL

## 2025-03-21 DIAGNOSIS — F41.9 ANXIETY: ICD-10-CM

## 2025-03-21 RX ORDER — SERTRALINE HYDROCHLORIDE 50 MG/1
75 TABLET, FILM COATED ORAL DAILY
Qty: 45 TABLET | Refills: 1 | Status: SHIPPED | OUTPATIENT
Start: 2025-03-21

## 2025-03-26 ENCOUNTER — APPOINTMENT (OUTPATIENT)
Dept: RADIOLOGY | Facility: CLINIC | Age: 40
End: 2025-03-26
Payer: COMMERCIAL

## 2025-03-27 ENCOUNTER — HOSPITAL ENCOUNTER (OUTPATIENT)
Dept: RADIOLOGY | Facility: CLINIC | Age: 40
Discharge: HOME | End: 2025-03-27
Payer: COMMERCIAL

## 2025-03-27 DIAGNOSIS — O09.529 ANTEPARTUM MULTIGRAVIDA OF ADVANCED MATERNAL AGE (HHS-HCC): ICD-10-CM

## 2025-03-27 DIAGNOSIS — Z36.9 FIRST TRIMESTER SCREENING (HHS-HCC): ICD-10-CM

## 2025-03-27 PROCEDURE — 76811 OB US DETAILED SNGL FETUS: CPT

## 2025-04-11 ENCOUNTER — APPOINTMENT (OUTPATIENT)
Dept: OBSTETRICS AND GYNECOLOGY | Facility: CLINIC | Age: 40
End: 2025-04-11
Payer: COMMERCIAL

## 2025-04-11 VITALS — BODY MASS INDEX: 44.04 KG/M2 | SYSTOLIC BLOOD PRESSURE: 120 MMHG | DIASTOLIC BLOOD PRESSURE: 78 MMHG | WEIGHT: 270.8 LBS

## 2025-04-11 DIAGNOSIS — O09.899 HISTORY OF PRETERM DELIVERY, CURRENTLY PREGNANT (HHS-HCC): ICD-10-CM

## 2025-04-11 DIAGNOSIS — Z87.59 HISTORY OF PRE-ECLAMPSIA: ICD-10-CM

## 2025-04-11 DIAGNOSIS — O10.919 CHRONIC HYPERTENSION IN PREGNANCY (HHS-HCC): ICD-10-CM

## 2025-04-11 DIAGNOSIS — Z34.80 SUPERVISION OF OTHER NORMAL PREGNANCY, ANTEPARTUM (HHS-HCC): Primary | ICD-10-CM

## 2025-04-11 DIAGNOSIS — O99.210 OBESITY IN PREGNANCY (HHS-HCC): ICD-10-CM

## 2025-04-11 DIAGNOSIS — O09.529 ANTEPARTUM MULTIGRAVIDA OF ADVANCED MATERNAL AGE (HHS-HCC): ICD-10-CM

## 2025-04-11 LAB
POC GLUCOSE, URINE: NEGATIVE MG/DL
POC KETONES, URINE: NEGATIVE MG/DL
POC PROTEIN, URINE: ABNORMAL MG/DL
POC SPECIFIC GRAVITY, URINE: 1.01

## 2025-04-11 NOTE — PROGRESS NOTES
Subjective   Sarahy Rehman is a 39 y.o.  at 22w4d, presents for a routine prenatal visit.   Reports good fetal movement.   Anatomy ultrasound incomplete, Goes back in 2 weeks.     Objective     Visit Vitals  /78   Wt 123 kg (270 lb 12.8 oz)   LMP  (LMP Unknown)   BMI 44.04 kg/m²   OB Status Pregnant   Smoking Status Former   BSA 2.39 m²       Lab Results   Component Value Date    HGB 13.4 2024    HCT 39.8 2024     Problem List Items Addressed This Visit       Chronic hypertension in pregnancy ([facility])    Overview     Meds: Labetalol  [x]  Baseline HELLP labs negative  [x]  Baseline UPCr 0.07  [x]   mg/day starting at 12 weeks GA Taking as advised  [x]  MFM consult (if complicated/appropriate) Referral placed   []  US for growth (/30/36 wks if meds, 36 no meds)  []  NST/BPP weekly starting at 32 weeks (meds)    Delivery recommended: 15k3p-63k3i no meds, 47k4o-04r6k meds           Obesity in pregnancy ([facility])    Overview     [x]  BMI at ENOB 43.62  [x]  Baseline A1c 5.4/108  []  NST: weekly at 34 weeks (BMI 40+ at first visit)/36 weeks (BMI 35-39.9 at first visit)  []  Growth US 30 and 36 wks  If BMI 50+ at any point in pregnancy, delivery at [facility] [place]  Delivery recommended 39w0d to 39w6d             Supervision of other normal pregnancy, antepartum ([facility]) - Primary    Overview         1st Trimester  [x]  OB profile/lab work 24 NL, BLOOD TYPE IS O+  [x]  Pap 1-15-25  NEG HPV NEG  [x]  GC DNA probe 1-15-25 NEG/NEG  [x]  Urine culture 1-15-25 NEG  [x]  Early A1c (BMI 30+) 24 5.4/108  [x]  Aneuploidy screening (Prequel 10+ wk/First Trimester Check 11-14 wk) 1/15/25 PREQUEL NEG  [x]  Carrier screening 1/15/25 FORESIGHT NEG  [x]  First trimester anatomy US/NT - 25: 13w2d, CRL c/w SHA, normal organ survey, NT subjectively normal.     2nd Trimester  [x]  Anatomy US (19-21 wks) 3/27/25: 20w2d, biometry c/w GA, posterior placenta w/o previa, no malformations  but views of spine, aortic arch, SVC/IVC incomplete. Follow up in 2 weeks.  []  1 hour Glucose (25-28 wks)  []  CBC (25-28 wks)  []  3 hour GTT (if needed)    3rd Trimester  []  GBS (36-37 wks)  []  L&D consent  []  TOLAC consent (if needed)    Vaccines  [x]  COVID 2021 x3, 2022 x1  [x]  Flu (September to May) 2024  []  Tdap (27-36 wks)  []  RSV (32-36 wks Sept-)             Relevant Orders    POCT UA (nonautomated) manually resulted (Completed)    CBC Anemia Panel With Reflex,Pregnancy    Glucose, 1 Hour Screen, Pregnancy    Antepartum multigravida of advanced maternal age ([facility])    Overview     [x]  Aneuploidy screening offered - Prequel NIPT ordered 1/15/25  []  NST weekly at 36 weeks (40+)  []   US for growth 30, 36 weeks (40+)    Delivery recommended: 75a9m-75c6l           History of pre-eclampsia    Overview     Superimposed sPEC in G1.   Delivery at 34 weeks.  Baseline PIH labs - normal; UPC 0.07         History of  delivery, currently pregnant ([facility])    Overview     34 week delivery in G1  IOL for CHTN with superimposed PEC             Plan    RTO 4 weeks.   Glucose/CBC next visit.     Raisa Lopez MD

## 2025-04-11 NOTE — PATIENT INSTRUCTIONS
You were seen in the office today for routine OB care and had normal findings on exam  Continue routine OB precautions at home  Continue taking prenatal vitamins   Avoid sick contacts and consider getting your Flu (available in office during flu season) and COVID vaccines to protect against infection in pregnancy  You will be given a bottle of Glucola and orders for your second trimester labs. Please complete these between 25 and 28 weeks gestation. You may complete these at any  outpatient lab, and do not need an appointment  Make an appointment for routine care in the office in the next 4 weeks  If you are having any concerns prior to your next visit please call the office to speak to the physician on call. This includes after hours, weekends, and holidays, when the answering service will be able to connect you with the physician on call. [phone] ([place] Office) or [phone] [place] Office.

## 2025-04-18 ENCOUNTER — HOSPITAL ENCOUNTER (OUTPATIENT)
Dept: RADIOLOGY | Facility: CLINIC | Age: 40
Discharge: HOME | End: 2025-04-18
Payer: COMMERCIAL

## 2025-04-18 DIAGNOSIS — Z36.9 FIRST TRIMESTER SCREENING (HHS-HCC): ICD-10-CM

## 2025-04-18 DIAGNOSIS — O09.529 ANTEPARTUM MULTIGRAVIDA OF ADVANCED MATERNAL AGE (HHS-HCC): ICD-10-CM

## 2025-04-18 PROCEDURE — 76816 OB US FOLLOW-UP PER FETUS: CPT

## 2025-05-09 ENCOUNTER — APPOINTMENT (OUTPATIENT)
Dept: OBSTETRICS AND GYNECOLOGY | Facility: CLINIC | Age: 40
End: 2025-05-09
Payer: COMMERCIAL

## 2025-05-09 VITALS — WEIGHT: 270 LBS | BODY MASS INDEX: 43.91 KG/M2 | DIASTOLIC BLOOD PRESSURE: 80 MMHG | SYSTOLIC BLOOD PRESSURE: 130 MMHG

## 2025-05-09 DIAGNOSIS — Z3A.26 26 WEEKS GESTATION OF PREGNANCY (HHS-HCC): Primary | ICD-10-CM

## 2025-05-09 DIAGNOSIS — Z34.80 SUPERVISION OF OTHER NORMAL PREGNANCY, ANTEPARTUM (HHS-HCC): ICD-10-CM

## 2025-05-09 DIAGNOSIS — O10.919 CHRONIC HYPERTENSION IN PREGNANCY (HHS-HCC): ICD-10-CM

## 2025-05-09 PROCEDURE — 0501F PRENATAL FLOW SHEET: CPT | Performed by: OBSTETRICS & GYNECOLOGY

## 2025-05-09 NOTE — PROGRESS NOTES
Ob Visit  25     SUBJECTIVE    HPI: Sarahy Rehman is a 39 y.o.  at 26w4d here for RPNV.       She is feeling ok today. No changes since last visit.  Denies concerns.  Baby is moving well.    OBJECTIVE  Visit Vitals  /80   Wt 122 kg (270 lb)   LMP  (LMP Unknown)   BMI 43.91 kg/m²   OB Status Pregnant   Smoking Status Former   BSA 2.38 m²        Lab Results   Component Value Date    HGB 13.4 2024    HCT 39.8 2024       Physical Exam:   Weight: 122 kg (270 lb)  Expected Total Weight Gain: 5 kg (11 lb)-9 kg (19 lb)   Pregravid BMI: 43.75  BP: 130/80                    ASSESSMENT & PLAN    Sarahy Rehman is a 39 y.o.  at 26w4d here for the following concerns we addressed today:    Problem List Items Addressed This Visit       Supervision of other normal pregnancy, antepartum ([facility])    Overview   40 yo     1st Trimester  [x]  OB profile/lab work 24 NL, BLOOD TYPE IS O+  [x]  Pap 1-15-25  NEG HPV NEG  [x]  GC DNA probe 1-15-25 NEG/NEG  [x]  Urine culture 1-15-25 NEG  [x]  Early A1c (BMI 30+) 24 5.4/108  [x]  Aneuploidy screening (Prequel 10+ wk/First Trimester Check 11-14 wk) 1/15/25 PREQUEL NEG  [x]  Carrier screening 1/15/25 FORESIGHT NEG  [x]  First trimester anatomy US/NT - 25: 13w2d, CRL c/w SHA, normal organ survey, NT subjectively normal.     2nd Trimester  [x]  Anatomy US (19-21 wks) 3/27/25: 20w2d, biometry c/w GA, posterior placenta w/o previa, no malformations but views of spine, aortic arch, SVC/IVC incomplete. Follow up in 2 weeks.  Ultrasound 25 at 23w3d. Normal growth. Anatomy views completed. Follow up at 28 weeks.  []  1 hour Glucose (25-28 wks) ORDERED  []  CBC (25-28 wks) ORDERED  []  3 hour GTT (if needed)    3rd Trimester  []  GBS (36-37 wks)  []  L&D consent  []  TOLAC consent (if needed)    Vaccines  [x]  COVID 2021 x3, 2022 x1  [x]  Flu (September to May) 2024  []  Tdap (27-36 wks)  []  RSV (32-36 wks Sept-)              Relevant Orders    POCT UA (nonautomated) manually resulted (Completed)           Doing well today.  BP well controlled.  Has next growth ultrasound scheduled.  Plans to do glucose screening next week.  Precautions reviewed. Advised her to call for any new problems.    RTC in 4 weeks      Ivonne Merlos MD

## 2025-05-16 ENCOUNTER — LAB (OUTPATIENT)
Dept: LAB | Facility: HOSPITAL | Age: 40
End: 2025-05-16
Payer: COMMERCIAL

## 2025-05-16 LAB
ERYTHROCYTE [DISTWIDTH] IN BLOOD BY AUTOMATED COUNT: 13.7 % (ref 11.5–14.5)
HCT VFR BLD AUTO: 36.6 % (ref 36–46)
HGB BLD-MCNC: 12.6 G/DL (ref 12–16)
MCH RBC QN AUTO: 31.2 PG (ref 26–34)
MCHC RBC AUTO-ENTMCNC: 34.4 G/DL (ref 32–36)
MCV RBC AUTO: 91 FL (ref 80–100)
NRBC BLD-RTO: 0 /100 WBCS (ref 0–0)
PLATELET # BLD AUTO: 253 X10*3/UL (ref 150–450)
RBC # BLD AUTO: 4.04 X10*6/UL (ref 4–5.2)
REFLEX ADDED, ANEMIA PANEL: NORMAL
WBC # BLD AUTO: 9.7 X10*3/UL (ref 4.4–11.3)

## 2025-05-16 PROCEDURE — 85027 COMPLETE CBC AUTOMATED: CPT

## 2025-05-17 LAB — GLUCOSE 1H P 50 G GLC PO SERPL-MCNC: 148 MG/DL

## 2025-05-20 DIAGNOSIS — R73.09 GLUCOSE TOLERANCE TEST ABNORMAL: Primary | ICD-10-CM

## 2025-05-23 LAB
GLUCOSE 1H P CHAL SERPL-MCNC: 145 MG/DL
GLUCOSE 2H P CHAL SERPL-MCNC: 104 MG/DL
GLUCOSE 3H P 100 G GLC PO SERPL-MCNC: 74 MG/DL
GLUCOSE P FAST SERPL-MCNC: 95 MG/DL (ref 65–94)
SERVICE CMNT-IMP: ABNORMAL

## 2025-05-28 ENCOUNTER — HOSPITAL ENCOUNTER (OUTPATIENT)
Dept: RADIOLOGY | Facility: CLINIC | Age: 40
Discharge: HOME | End: 2025-05-28
Payer: COMMERCIAL

## 2025-05-28 DIAGNOSIS — Z36.9 FIRST TRIMESTER SCREENING (HHS-HCC): ICD-10-CM

## 2025-05-28 DIAGNOSIS — O09.529 ANTEPARTUM MULTIGRAVIDA OF ADVANCED MATERNAL AGE (HHS-HCC): ICD-10-CM

## 2025-05-28 PROCEDURE — 76819 FETAL BIOPHYS PROFIL W/O NST: CPT

## 2025-05-28 PROCEDURE — 76816 OB US FOLLOW-UP PER FETUS: CPT

## 2025-06-06 ENCOUNTER — APPOINTMENT (OUTPATIENT)
Dept: OBSTETRICS AND GYNECOLOGY | Facility: CLINIC | Age: 40
End: 2025-06-06
Payer: COMMERCIAL

## 2025-06-06 VITALS — BODY MASS INDEX: 45.05 KG/M2 | WEIGHT: 277 LBS | DIASTOLIC BLOOD PRESSURE: 78 MMHG | SYSTOLIC BLOOD PRESSURE: 128 MMHG

## 2025-06-06 DIAGNOSIS — Z23 NEED FOR TDAP VACCINATION: ICD-10-CM

## 2025-06-06 DIAGNOSIS — Z3A.30 30 WEEKS GESTATION OF PREGNANCY (HHS-HCC): ICD-10-CM

## 2025-06-06 PROBLEM — R73.09 GLUCOSE TOLERANCE TEST ABNORMAL: Status: ACTIVE | Noted: 2025-06-06

## 2025-06-06 PROBLEM — Z22.7 LATENT TUBERCULOSIS: Status: RESOLVED | Noted: 2024-08-02 | Resolved: 2025-06-06

## 2025-06-06 PROBLEM — L40.9 PSORIASIS: Status: RESOLVED | Noted: 2023-07-05 | Resolved: 2025-06-06

## 2025-06-06 NOTE — PATIENT INSTRUCTIONS
You were seen in the office today for routine OB care and had normal findings on exam  Continue routine OB precautions at home  Continue taking prenatal vitamins   Avoid sick contacts and consider getting your Flu (available in office during flu season) and COVID vaccines to protect against infection in pregnancy  We recommend getting the Tdap (available in office) and RSV vaccines to pass some immunity from mother to baby and protect your baby against RSV and Whooping cough in the first few months of life. Tdap can be given between 27 and 36 weeks, and RSV vaccinations can be given between 32 and 36 weeks gestation  Make an appointment for routine care in the office in the next 2 weeks  If you are having any painful contractions, vaginal bleeding, leaking amniotic fluid, or decrease in baby's movements prior to your next visit please call the office to speak to the physician on call. This includes after hours, weekends, and holidays, when the answering service will be able to connect you with the physician on call. [phone] ([place] Office) or [phone] ([place] Office).'

## 2025-06-06 NOTE — PROGRESS NOTES
Subjective   Patient ID [id number]   Sarahy Rehman is a 39 y.o.  at 30w4d with a working estimated date of delivery of 2025, by Ultrasound who presents for a routine prenatal visit. She denies vaginal bleeding, leakage of fluid, decreased fetal movements, or contractions.    Her pregnancy is complicated by:  CHTN  Failed 1 hour/passed 3 hour  Obesity  Hx SPEC  AMA  Anxiety    Objective   Physical Exam:   Weight: 126 kg (277 lb)  Expected Total Weight Gain: 5 kg (11 lb)-9 kg (19 lb)   Pregravid BMI: 43.75  BP: 128/78                  Prenatal Labs  Urine Dip:  Lab Results   Component Value Date    KETONESU NEGATIVE 2025     Lab Results   Component Value Date    HGB 12.6 2025    HCT 36.6 2025    ABO O 2024    HEPBSAG Nonreactive 2024           Imaging: normal interval growth, repeat 32 weeks    Assessment/Plan   Problem List Items Addressed This Visit           ICD-10-CM    30 weeks gestation of pregnancy ([facility]) Z3A.30    Relevant Orders    POCT UA Automated manually resulted (Completed)     Medical Problems       Problem List       30 weeks gestation of pregnancy ([facility])    Overview Addendum 2025  2:32 PM by Ivonne Merlos MD   40 yo     1st Trimester  [x]  OB profile/lab work 24 NL, BLOOD TYPE IS O+  [x]  Pap 1-15-25  NEG HPV NEG  [x]  GC DNA probe 1-15-25 NEG/NEG  [x]  Urine culture 1-15-25 NEG  [x]  Early A1c (BMI 30+) 24 5.4/108  [x]  Aneuploidy screening (Prequel 10+ wk/First Trimester Check 11-14 wk) 1/15/25 PREQUEL NEG  [x]  Carrier screening 1/15/25 FORESIGHT NEG  [x]  First trimester anatomy US/NT - 25: 13w2d, CRL c/w SHA, normal organ survey, NT subjectively normal.     2nd Trimester  [x]  Anatomy US (19-21 wks) 3/27/25: 20w2d, biometry c/w GA, posterior placenta w/o previa, no malformations but views of spine, aortic arch, SVC/IVC incomplete. Follow up in 2 weeks.  Ultrasound 25 at 23w3d. Normal growth. Anatomy views  completed. Follow up at 28 weeks.  Ultrasound 5/28/25 at 29w1d. Normal growth, 66%. BPP 8/8. Follow up at 32 weeks.  [x]  1 hour Glucose (25-28 wks) 5-16-25 148 - FAILED  [x]  CBC (25-28 wks) ORDERED 5-16-25 12.6/36.6  [x]  3 hour GTT (if needed) 5-22-25 95/145/104/74 - PASSED    3rd Trimester  []  GBS (36-37 wks)  []  L&D consent  []  TOLAC consent (if needed)    Vaccines  [x]  COVID 2021 x3, 2022 x1  [x]  Flu (September to May) October 2024  [x]  Tdap (27-36 wks) 6/6/25  []  RSV (32-36 wks Sept-Jan)             Chronic hypertension in pregnancy ([facility])    Overview Addendum 2/14/2025  3:58 PM by BRADEN Montes De Oca-CNP   Meds: Labetalol  [x]  Baseline HELLP labs negative  [x]  Baseline UPCr 0.07  [x]   mg/day starting at 12 weeks GA Taking as advised  [x]  MFM consult (if complicated/appropriate) Referral placed 02/14  []  US for growth (28/30/36 wks if meds, 30/36 no meds)  []  NST/BPP weekly starting at 32 weeks (meds)    Delivery recommended: 47a0d-45t7h no meds, 60l4d-99j3b meds           Anxiety    Overview Signed 3/14/2025  4:32 PM by Rosey Whitley, DO   Stable on Zoloft, monitor closely for acute worsening         Obesity in pregnancy ([facility])    Overview Signed 1/9/2025 10:20 AM by Lisa Ravi RN   [x]  BMI at ENOB 43.62  [x]  Baseline A1c 5.4/108  []  NST: weekly at 34 weeks (BMI 40+ at first visit)/36 weeks (BMI 35-39.9 at first visit)  []  Growth US 30 and 36 wks  If BMI 50+ at any point in pregnancy, delivery at [facility]  Delivery recommended 39w0d to 39w6d             Antepartum multigravida of advanced maternal age ([facility])    Overview Addendum 1/15/2025  4:23 PM by Ivonne Merlos MD   [x]  Aneuploidy screening offered - Prequel NIPT ordered 1/15/25  []  NST weekly at 36 weeks (40+)  []   US for growth 30, 36 weeks (40+)    Delivery recommended: 89y6h-42m2j           History of pre-eclampsia    Overview Addendum 1/15/2025  9:59 AM by Ivonne Merlos MD   Superimposed  sPEC in G1.   Delivery at 34 weeks.  Baseline PIH labs - normal; UPC 0.07         History of  delivery, currently pregnant ([facility])    Overview Signed 1/15/2025 10:01 AM by Ivonne Merlos MD   34 week delivery in G1  IOL for CHTN with superimposed PEC         Glucose tolerance test abnormal    Overview Signed 2025  4:08 PM by Rosey Whitley DO   1 hour   [x]  3 hour GTT PASSED               Continue prenatal vitamin.  Labs reviewed.  GBS 36 WEEKS  Expected mode of delivery TBD  Follow up in 2 week for a routine prenatal visit.  Rosey Whitley DO

## 2025-06-12 ENCOUNTER — TELEPHONE (OUTPATIENT)
Dept: OBSTETRICS AND GYNECOLOGY | Facility: CLINIC | Age: 40
End: 2025-06-12
Payer: COMMERCIAL

## 2025-06-12 DIAGNOSIS — F41.9 ANXIETY: ICD-10-CM

## 2025-06-12 RX ORDER — SERTRALINE HYDROCHLORIDE 50 MG/1
100 TABLET, FILM COATED ORAL DAILY
Qty: 60 TABLET | Refills: 1 | Status: SHIPPED | OUTPATIENT
Start: 2025-06-12

## 2025-06-18 ENCOUNTER — APPOINTMENT (OUTPATIENT)
Dept: OBSTETRICS AND GYNECOLOGY | Facility: CLINIC | Age: 40
End: 2025-06-18
Payer: COMMERCIAL

## 2025-06-18 VITALS — WEIGHT: 277 LBS | SYSTOLIC BLOOD PRESSURE: 136 MMHG | DIASTOLIC BLOOD PRESSURE: 88 MMHG | BODY MASS INDEX: 45.05 KG/M2

## 2025-06-18 DIAGNOSIS — R73.09 GLUCOSE TOLERANCE TEST ABNORMAL: ICD-10-CM

## 2025-06-18 DIAGNOSIS — F41.9 ANXIETY: ICD-10-CM

## 2025-06-18 DIAGNOSIS — Z3A.32 32 WEEKS GESTATION OF PREGNANCY (HHS-HCC): Primary | ICD-10-CM

## 2025-06-18 DIAGNOSIS — Z87.59 HISTORY OF PRE-ECLAMPSIA: ICD-10-CM

## 2025-06-18 DIAGNOSIS — O09.899 HISTORY OF PRETERM DELIVERY, CURRENTLY PREGNANT (HHS-HCC): ICD-10-CM

## 2025-06-18 DIAGNOSIS — O10.919 CHRONIC HYPERTENSION IN PREGNANCY (HHS-HCC): ICD-10-CM

## 2025-06-18 DIAGNOSIS — O09.529 ANTEPARTUM MULTIGRAVIDA OF ADVANCED MATERNAL AGE (HHS-HCC): ICD-10-CM

## 2025-06-18 DIAGNOSIS — O99.210 OBESITY IN PREGNANCY (HHS-HCC): ICD-10-CM

## 2025-06-18 PROCEDURE — 59025 FETAL NON-STRESS TEST: CPT | Performed by: NURSE PRACTITIONER

## 2025-06-18 PROCEDURE — 81003 URINALYSIS AUTO W/O SCOPE: CPT | Performed by: NURSE PRACTITIONER

## 2025-06-18 PROCEDURE — 0501F PRENATAL FLOW SHEET: CPT | Performed by: NURSE PRACTITIONER

## 2025-06-18 NOTE — PROCEDURES
Sarahy Rehman, a  at 32w2d with an SHA of 2025, by Ultrasound, was seen at [facility] for a nonstress test.    Non-Stress Test   Variability in Waveform for Non-Stress Test: Moderate  Accelerations in Non-Stress Test: Yes, greater than/equal to 15 bpm, lasting at least 15 seconds  Decelerations in Non-Stress Test: None  Contractions in Non-Stress Test: Not present  Acoustic Stimulator for Non-Stress Test: No  Interpretation of Non-Stress Test   Interpretation of Non-Stress Test: Reactive  Comments on Non-Stress Test: Category 1

## 2025-06-18 NOTE — PROGRESS NOTES
Sarahy Rehman is a  at 32w2d with a working estimated date of delivery of 2025, by Ultrasound who presents for a routine prenatal visit.   She is feeling well. Baby is active.  She planning to bottle feed. Dr. Walton is planned pediatrician. Planning to have baby circumcised. Desires epidural in labor.     Pregnancy c/b:  CHTN: On Labetalol. Weekly NSTs beginning this week. Has growth US scheduled at the end of this week.   Failed 1 hour/passed 3 hour  Obesity  Hx sPEC  AMA  Anxiety    Objective   Physical Exam  Weight: 126 kg (277 lb)  Expected Total Weight Gain: 5 kg (11 lb)-9 kg (19 lb)   Pregravid BMI: 43.75  BP: 136/88  Fetal Heart Rate: 145             Non-Stress Test   Variability in Waveform for Non-Stress Test: Moderate  Accelerations in Non-Stress Test: Yes, greater than/equal to 15 bpm, lasting at least 15 seconds  Decelerations in Non-Stress Test: None  Contractions in Non-Stress Test: Not present  Acoustic Stimulator for Non-Stress Test: No  Interpretation of Non-Stress Test   Interpretation of Non-Stress Test: Reactive  Comments on Non-Stress Test: Category 1                           Problem List Items Addressed This Visit          Ob-Gyn Problems    Obesity in pregnancy ([facility])    Overview   [x]  BMI at ENOB 43.62  [x]  Baseline A1c 5.4/108  [x]  NST: Started at 32 weeks for CHTN/meds  [x]  Growth US 30 and 36 wks  If BMI 50+ at any point in pregnancy, delivery at [facility]  Delivery recommended 39w0d to 39w6d             History of  delivery, currently pregnant ([facility])    Overview   34 week delivery in G1  IOL for CHTN with superimposed PEC         History of pre-eclampsia    Overview   Superimposed sPEC in G1.   Delivery at 34 weeks.  Baseline PIH labs - normal; UPC 0.07         Glucose tolerance test abnormal    Overview   1 hour   [x]  3 hour GTT PASSED         Antepartum multigravida of advanced maternal age ([facility])    Overview   [x]  Aneuploidy screening offered -  Prequel NIPT ordered 1/15/25           32 weeks gestation of pregnancy ([facility]) - Primary    Overview   40 yo     1st Trimester  [x]  OB profile/lab work 24 NL, BLOOD TYPE IS O+  [x]  Pap 1-15-25  NEG HPV NEG  [x]  GC DNA probe 1-15-25 NEG/NEG  [x]  Urine culture 1-15-25 NEG  [x]  Early A1c (BMI 30+) 24 5.4/108  [x]  Aneuploidy screening (Prequel 10+ wk/First Trimester Check 11-14 wk) 1/15/25 PREQUEL NEG  [x]  Carrier screening 1/15/25 FORESIGHT NEG  [x]  First trimester anatomy US/NT - 25: 13w2d, CRL c/w SHA, normal organ survey, NT subjectively normal.     2nd Trimester  [x]  Anatomy US (19-21 wks) 3/27/25: 20w2d, biometry c/w GA, posterior placenta w/o previa, no malformations but views of spine, aortic arch, SVC/IVC incomplete. Follow up in 2 weeks.  Ultrasound 25 at 23w3d. Normal growth. Anatomy views completed. Follow up at 28 weeks.  Ultrasound 25 at 29w1d. Normal growth, 66%. BPP . Follow up at 32 weeks.  [x]  1 hour Glucose (25-28 wks) 25 148 - FAILED  [x]  CBC (25-28 wks) ORDERED 25 12.6/36.6  [x]  3 hour GTT (if needed) 25 95/145/104/74 - PASSED    3rd Trimester  []  GBS (36-37 wks)  []  L&D consent  []  TOLAC consent (if needed)    Vaccines  [x]  COVID 2021 x3, 2022 x1  [x]  Flu (September to May) 2024  [x]  Tdap (27-36 wks) 25  []  RSV (32-36 wks Sept-)             Relevant Orders    POCT UA Automated manually resulted (Completed)       Other    Chronic hypertension in pregnancy ([facility])    Overview   Meds: Labetalol  [x]  Baseline HELLP labs negative  [x]  Baseline UPCr 0.07  [x]   mg/day starting at 12 weeks GA Taking as advised  [x]  MFM consult (if complicated/appropriate) Referral placed   [x]  US for growth (28/30/36 wks if meds)  [x]  NST/BPP weekly starting at 32 weeks (meds)    Delivery recommended: 32s1v-52m2n no meds, 93p7u-89o8r meds           Relevant Orders    Fetal nonstress test    Anxiety    Overview    Stable on Zoloft, monitor closely for acute worsening             Weekly  testing, reactive NST today  Precautions given  Follow up in 1 week(s) for a routine prenatal visit or sooner if needed.

## 2025-06-20 ENCOUNTER — ANCILLARY PROCEDURE (OUTPATIENT)
Dept: RADIOLOGY | Age: 40
End: 2025-06-20
Payer: COMMERCIAL

## 2025-06-20 DIAGNOSIS — O09.529 ANTEPARTUM MULTIGRAVIDA OF ADVANCED MATERNAL AGE (HHS-HCC): ICD-10-CM

## 2025-06-20 DIAGNOSIS — O10.919 HTN IN PREGNANCY, CHRONIC (HHS-HCC): ICD-10-CM

## 2025-06-20 DIAGNOSIS — O99.213 OBESITY AFFECTING PREGNANCY IN THIRD TRIMESTER (HHS-HCC): ICD-10-CM

## 2025-06-20 DIAGNOSIS — Z36.9 FIRST TRIMESTER SCREENING (HHS-HCC): ICD-10-CM

## 2025-06-20 PROCEDURE — 76819 FETAL BIOPHYS PROFIL W/O NST: CPT | Performed by: MEDICAL GENETICS

## 2025-06-20 PROCEDURE — 76816 OB US FOLLOW-UP PER FETUS: CPT | Performed by: MEDICAL GENETICS

## 2025-06-20 PROCEDURE — 76819 FETAL BIOPHYS PROFIL W/O NST: CPT

## 2025-06-24 NOTE — PROGRESS NOTES
Ob Visit  25     SUBJECTIVE      HPI: Sarahy Rehman is a 39 y.o.  at 33w3d here for RPNV.    She has no contractions, no bleeding, or no LOF.   Reports normal fetal movement.   Patient without headaches, visual changes or RUQ pain.  Bps at home 130s/70-80s.  Growth scan last week appropriate.    Pregnancy c/b:  CHTN: On Labetalol. Weekly NSTs beginning this week. Has growth US scheduled at the end of this week.   Failed 1 hour/passed 3 hour  Pregravid BMI 43.75  Hx sPEC  AMA  Anxiety       OBJECTIVE  Objective   Physical Exam  Weight: 126 kg (277 lb)  BP: 148/82  Fetal Heart Rate: 120 Fundal Height (cm): 40 cm    Lab Results   Component Value Date    HGB 12.6 2025    HCT 36.6 2025         ASSESSMENT & PLAN    Sarahy Rehman is a 39 y.o.  at 33w3d here for the following concerns we addressed today:    Problem List Items Addressed This Visit       Chronic hypertension in pregnancy ([facility])    Overview   Meds: Labetalol  [x]  Baseline HELLP labs negative  [x]  Baseline UPCr 0.07  [x]   mg/day starting at 12 weeks GA Taking as advised  [x]  MFM consult (if complicated/appropriate) Referral placed   [x]  US for growth (28/30/36 wks if meds)  [x]  NST/BPP weekly starting at 32 weeks (meds)    Delivery recommended: 13s5z-85v2l no meds, 08o5j-54i2d meds           Relevant Orders    Fetal nonstress test (Completed)    Alanine Aminotransferase    Aspartate Aminotransferase    CBC    Creatinine    Lactate Dehydrogenase    Protein, Urine Random    Uric Acid    Anxiety    Overview   Stable on Zoloft, monitor closely for acute worsening         Obesity in pregnancy ([facility])    Overview   [x]  BMI at ENOB 43.62  [x]  Baseline A1c 5.4/108  [x]  NST: Started at 32 weeks for CHTN/meds  [x]  Growth US 30 and 36 wks  If BMI 50+ at any point in pregnancy, delivery at [facility]  Delivery recommended 39w0d to 39w6d             Relevant Orders    Fetal nonstress test (Completed)    33 weeks  gestation of pregnancy ([facility])    Overview   40 yo     1st Trimester  [x]  OB profile/lab work 24 NL, BLOOD TYPE IS O+  [x]  Pap 1-15-25  NEG HPV NEG  [x]  GC DNA probe 1-15-25 NEG/NEG  [x]  Urine culture 1-15-25 NEG  [x]  Early A1c (BMI 30+) 24 5.4/108  [x]  Aneuploidy screening (Prequel 10+ wk/First Trimester Check 11-14 wk) 1/15/25 PREQUEL NEG  [x]  Carrier screening 1/15/25 FORESIGHT NEG  [x]  First trimester anatomy US/NT - 25: 13w2d, CRL c/w SHA, normal organ survey, NT subjectively normal.     2nd Trimester  [x]  Anatomy US (19-21 wks) 3/27/25: 20w2d, biometry c/w GA, posterior placenta w/o previa, no malformations but views of spine, aortic arch, SVC/IVC incomplete. Follow up in 2 weeks.  Ultrasound 25 at 23w3d. Normal growth. Anatomy views completed. Follow up at 28 weeks.  Ultrasound 25 at 29w1d. Normal growth, 66%. BPP 8/8. Follow up at 32 weeks.  Ultrasound 25 at 32w3d. Normal growth, EFW 58%. BPP 8/8. Follow up in 4 weeks.  [x]  1 hour Glucose (25-28 wks) 25 148 - FAILED  [x]  CBC (25-28 wks) ORDERED 25 12.6/36.6  [x]  3 hour GTT (if needed) 25 95/145/104/74 - PASSED    3rd Trimester  []  GBS (36-37 wks)  []  L&D consent  []  TOLAC consent (if needed)    Vaccines  [x]  COVID 2021 x3, 2022 x1  [x]  Flu (September to May) 2024  [x]  Tdap (27-36 wks) 25  []  RSV (32-36 wks Sept-)             Relevant Orders    POCT UA (nonautomated) manually resulted (Completed)    Antepartum multigravida of advanced maternal age ([facility])    Overview   [x]  Aneuploidy screening offered - Prequel NIPT ordered 1/15/25           History of pre-eclampsia    Overview   Superimposed sPEC in G1.   Delivery at 34 weeks.  Baseline PIH labs - normal; UPC 0.07         History of  delivery, currently pregnant ([facility])    Overview   34 week delivery in G1  IOL for CHTN with superimposed PEC         Glucose tolerance test abnormal    Overview   1 hour GTT  148  [x]  3 hour GTT PASSED        Has another growth scan in a month.    Repeat BP, will do labs today.  NST reactive  RTC in 1 weeks      Bonny Osborne MD

## 2025-06-26 ENCOUNTER — APPOINTMENT (OUTPATIENT)
Dept: OBSTETRICS AND GYNECOLOGY | Facility: CLINIC | Age: 40
End: 2025-06-26
Payer: COMMERCIAL

## 2025-06-26 VITALS — SYSTOLIC BLOOD PRESSURE: 148 MMHG | DIASTOLIC BLOOD PRESSURE: 88 MMHG | BODY MASS INDEX: 45.05 KG/M2 | WEIGHT: 277 LBS

## 2025-06-26 DIAGNOSIS — Z87.59 HISTORY OF PRE-ECLAMPSIA: ICD-10-CM

## 2025-06-26 DIAGNOSIS — O09.529 ANTEPARTUM MULTIGRAVIDA OF ADVANCED MATERNAL AGE (HHS-HCC): ICD-10-CM

## 2025-06-26 DIAGNOSIS — R73.09 GLUCOSE TOLERANCE TEST ABNORMAL: ICD-10-CM

## 2025-06-26 DIAGNOSIS — O10.919 CHRONIC HYPERTENSION IN PREGNANCY (HHS-HCC): ICD-10-CM

## 2025-06-26 DIAGNOSIS — O09.899 HISTORY OF PRETERM DELIVERY, CURRENTLY PREGNANT (HHS-HCC): ICD-10-CM

## 2025-06-26 DIAGNOSIS — Z3A.33 33 WEEKS GESTATION OF PREGNANCY (HHS-HCC): ICD-10-CM

## 2025-06-26 DIAGNOSIS — O99.210 OBESITY IN PREGNANCY (HHS-HCC): ICD-10-CM

## 2025-06-26 DIAGNOSIS — F41.9 ANXIETY: ICD-10-CM

## 2025-06-26 RX ORDER — ASPIRIN 81 MG/1
81 TABLET ORAL DAILY
COMMUNITY

## 2025-06-26 NOTE — PROCEDURES
Sarahy Rehman, a  at 33w3d with an SHA of 2025, by Ultrasound, was seen at [facility] for a nonstress test.    Non-Stress Test   Baseline Fetal Heart Rate for Non-Stress Test: 120 BPM  Variability in Waveform for Non-Stress Test: Moderate  Accelerations in Non-Stress Test: Yes  Decelerations in Non-Stress Test: None  Contractions in Non-Stress Test: Not present  Interpretation of Non-Stress Test   Interpretation of Non-Stress Test: Reactive  Comments on Non-Stress Test: baseline 120-130s

## 2025-06-26 NOTE — PATIENT INSTRUCTIONS
You were seen in the office today for routine OB care   Continue routine OB precautions at home  Continue taking prenatal vitamins   Avoid sick contacts and consider getting your Flu (available in office during flu season) and COVID vaccines to protect against infection in pregnancy  We recommend getting the Tdap (available in office) and RSV vaccines to pass some immunity from mother to baby and protect your baby against RSV and Whooping cough in the first few months of life. Tdap can be given between 27 and 36 weeks, and RSV vaccinations can be given between 32 and 36 weeks gestation  Make an appointment for routine care in the office in the next 2 weeks  If you are having any painful contractions, vaginal bleeding, leaking amniotic fluid, or decrease in baby's movements prior to your next visit please call the office to speak to the physician on call. This includes after hours, weekends, and holidays, when the answering service will be able to connect you with the physician on call. [phone] ([place] Office) or [phone] ([place] Office).

## 2025-06-27 LAB
ALT SERPL-CCNC: 12 U/L (ref 6–29)
AST SERPL-CCNC: 16 U/L (ref 10–30)
CREAT SERPL-MCNC: 0.49 MG/DL (ref 0.5–0.97)
EGFRCR SERPLBLD CKD-EPI 2021: 124 ML/MIN/1.73M2
ERYTHROCYTE [DISTWIDTH] IN BLOOD BY AUTOMATED COUNT: 13.3 % (ref 11–15)
HCT VFR BLD AUTO: 37.4 % (ref 35–45)
HGB BLD-MCNC: 12.8 G/DL (ref 11.7–15.5)
LDH SERPL P TO L-CCNC: 170 U/L (ref 100–200)
MCH RBC QN AUTO: 30.7 PG (ref 27–33)
MCHC RBC AUTO-ENTMCNC: 34.2 G/DL (ref 32–36)
MCV RBC AUTO: 89.7 FL (ref 80–100)
PLATELET # BLD AUTO: 261 THOUSAND/UL (ref 140–400)
PMV BLD REES-ECKER: 10.2 FL (ref 7.5–12.5)
RBC # BLD AUTO: 4.17 MILLION/UL (ref 3.8–5.1)
URATE SERPL-MCNC: 4.8 MG/DL (ref 2.5–7)
WBC # BLD AUTO: 12.2 THOUSAND/UL (ref 3.8–10.8)

## 2025-06-28 LAB
CREAT UR-MCNC: 38 MG/DL (ref 20–275)
PROT UR-MCNC: 9 MG/DL (ref 5–24)
PROT/CREAT UR: 0.24 MG/MG CREAT (ref 0.02–0.18)
PROT/CREAT UR: 237 MG/G CREAT (ref 24–184)

## 2025-07-02 ENCOUNTER — APPOINTMENT (OUTPATIENT)
Dept: OBSTETRICS AND GYNECOLOGY | Facility: CLINIC | Age: 40
End: 2025-07-02
Payer: COMMERCIAL

## 2025-07-02 VITALS — SYSTOLIC BLOOD PRESSURE: 138 MMHG | DIASTOLIC BLOOD PRESSURE: 86 MMHG | WEIGHT: 280 LBS | BODY MASS INDEX: 45.54 KG/M2

## 2025-07-02 DIAGNOSIS — F41.9 ANXIETY: ICD-10-CM

## 2025-07-02 DIAGNOSIS — R73.09 GLUCOSE TOLERANCE TEST ABNORMAL: ICD-10-CM

## 2025-07-02 DIAGNOSIS — O09.899 HISTORY OF PRETERM DELIVERY, CURRENTLY PREGNANT (HHS-HCC): ICD-10-CM

## 2025-07-02 DIAGNOSIS — Z87.59 HISTORY OF PRE-ECLAMPSIA: ICD-10-CM

## 2025-07-02 DIAGNOSIS — O10.919 CHRONIC HYPERTENSION IN PREGNANCY (HHS-HCC): ICD-10-CM

## 2025-07-02 DIAGNOSIS — Z3A.34 34 WEEKS GESTATION OF PREGNANCY (HHS-HCC): Primary | ICD-10-CM

## 2025-07-02 DIAGNOSIS — O09.529 ANTEPARTUM MULTIGRAVIDA OF ADVANCED MATERNAL AGE (HHS-HCC): ICD-10-CM

## 2025-07-02 DIAGNOSIS — O99.210 OBESITY IN PREGNANCY (HHS-HCC): ICD-10-CM

## 2025-07-02 PROCEDURE — 59025 FETAL NON-STRESS TEST: CPT | Performed by: NURSE PRACTITIONER

## 2025-07-02 PROCEDURE — 99213 OFFICE O/P EST LOW 20 MIN: CPT | Performed by: NURSE PRACTITIONER

## 2025-07-02 NOTE — PROGRESS NOTES
Sarahy Rehman is a  at 34w2d with a working estimated date of delivery of 2025, by Ultrasound who presents for a routine prenatal visit.   She is feeling well. Baby is active.    Pregnancy c/b:  CHTN: On Labetalol. Weekly NSTs. Next growth US scheduled . PEC labs last week normal. UPCr 0.237.  Failed 1 hour/passed 3 hour  Obesity: Pregravid BMI 43.75  H/O sPEC  AMA  Anxiety    Objective   Physical Exam  Weight: 127 kg (280 lb)  Expected Total Weight Gain: 5 kg (11 lb)-9 kg (19 lb)   Pregravid BMI: 43.75  BP: 138/86  Fetal Heart Rate: 145             Non-Stress Test   Baseline Fetal Heart Rate for Non-Stress Test: 145 BPM  Variability in Waveform for Non-Stress Test: Moderate  Accelerations in Non-Stress Test: Yes, greater than/equal to 15 bpm, lasting at least 15 seconds  Decelerations in Non-Stress Test: None  Contractions in Non-Stress Test: Not present  Acoustic Stimulator for Non-Stress Test: No  Interpretation of Non-Stress Test   Interpretation of Non-Stress Test: Reactive  Comments on Non-Stress Test: Category 1                           Problem List Items Addressed This Visit          Ob-Gyn Problems    Obesity in pregnancy ([facility])    Overview   [x]  BMI at ENOB 43.62  [x]  Baseline A1c 5.4/108  [x]  NST: Started at 32 weeks for CHTN/meds  [x]  Growth US 30 and 36 wks  If BMI 50+ at any point in pregnancy, delivery at [facility]  Delivery recommended 39w0d to 39w6d             Relevant Orders    Fetal nonstress test    History of  delivery, currently pregnant ([facility])    Overview   34 week delivery in G1  IOL for CHTN with superimposed PEC         History of pre-eclampsia    Overview   Superimposed sPEC in G1.   Delivery at 34 weeks.  Baseline PIH labs - normal; UPC 0.07         Glucose tolerance test abnormal    Overview   1 hour   [x]  3 hour GTT PASSED         Antepartum multigravida of advanced maternal age ([facility])    Overview   [x]  Aneuploidy screening offered - Prequel  NIPT ordered 1/15/25           34 weeks gestation of pregnancy ([facility]) - Primary    Overview   38 yo     1st Trimester  [x]  OB profile/lab work 24 NL, BLOOD TYPE IS O+  [x]  Pap 1-15-25  NEG HPV NEG  [x]  GC DNA probe 1-15-25 NEG/NEG  [x]  Urine culture 1-15-25 NEG  [x]  Early A1c (BMI 30+) 24 5.4/108  [x]  Aneuploidy screening (Prequel 10+ wk/First Trimester Check 11-14 wk) 1/15/25 PREQUEL NEG  [x]  Carrier screening 1/15/25 FORESIGHT NEG  [x]  First trimester anatomy US/NT - 25: 13w2d, CRL c/w SHA, normal organ survey, NT subjectively normal.     2nd Trimester  [x]  Anatomy US (19-21 wks) 3/27/25: 20w2d, biometry c/w GA, posterior placenta w/o previa, no malformations but views of spine, aortic arch, SVC/IVC incomplete. Follow up in 2 weeks.  Ultrasound 25 at 23w3d. Normal growth. Anatomy views completed. Follow up at 28 weeks.  Ultrasound 25 at 29w1d. Normal growth, 66%. BPP 8/8. Follow up at 32 weeks.  Ultrasound 25 at 32w3d. Normal growth, EFW 58%. BPP 8/8. Follow up in 4 weeks.  [x]  1 hour Glucose (25-28 wks) 25 148 - FAILED  [x]  CBC (25-28 wks) ORDERED 25 12.6/36.6  [x]  3 hour GTT (if needed) 25 95/145/104/74 - PASSED    3rd Trimester  []  GBS (36-37 wks)  []  L&D consent  []  TOLAC consent (if needed)    Vaccines  [x]  COVID 2021 x3, 2022 x1  [x]  Flu (September to May) 2024  [x]  Tdap (27-36 wks) 25  []  RSV (32-36 wks Sept-)             Relevant Orders    POCT UA Automated manually resulted (Completed)       Other    Chronic hypertension in pregnancy ([facility])    Overview   Meds: Labetalol  [x]  Baseline HELLP labs negative  [x]  Baseline UPCr 0.07  [x]   mg/day starting at 12 weeks GA Taking as advised  [x]  MFM consult (if complicated/appropriate) Referral placed   [x]  US for growth (/36 wks if meds)  [x]  NST/BPP weekly starting at 32 weeks (meds)    Delivery recommended: [id number]-77p5h no meds, 89j1x-76w2h  meds           Relevant Orders    Fetal nonstress test    Anxiety    Overview   Stable on Zoloft, monitor closely for acute worsening             Precautions given  Follow up in 1 week(s) for a routine prenatal visit or sooner if needed.

## 2025-07-02 NOTE — PROCEDURES
Sarahy Rehman, a  at 34w2d with an SHA of 2025, by Ultrasound, was seen at [facility] for a nonstress test.    Non-Stress Test   Baseline Fetal Heart Rate for Non-Stress Test: 145 BPM  Variability in Waveform for Non-Stress Test: Moderate  Accelerations in Non-Stress Test: Yes, greater than/equal to 15 bpm, lasting at least 15 seconds  Decelerations in Non-Stress Test: None  Contractions in Non-Stress Test: Not present  Acoustic Stimulator for Non-Stress Test: No  Interpretation of Non-Stress Test   Interpretation of Non-Stress Test: Reactive  Comments on Non-Stress Test: Category 1

## 2025-07-10 PROBLEM — Z3A.35 35 WEEKS GESTATION OF PREGNANCY (HHS-HCC): Status: ACTIVE | Noted: 2025-01-02

## 2025-07-11 ENCOUNTER — HOSPITAL ENCOUNTER (INPATIENT)
Facility: HOSPITAL | Age: 40
End: 2025-07-11
Attending: OBSTETRICS & GYNECOLOGY | Admitting: OBSTETRICS & GYNECOLOGY
Payer: COMMERCIAL

## 2025-07-11 ENCOUNTER — ANESTHESIA (OUTPATIENT)
Dept: OBSTETRICS AND GYNECOLOGY | Facility: HOSPITAL | Age: 40
End: 2025-07-11
Payer: COMMERCIAL

## 2025-07-11 ENCOUNTER — HOSPITAL ENCOUNTER (INPATIENT)
Facility: HOSPITAL | Age: 40
End: 2025-07-11
Payer: COMMERCIAL

## 2025-07-11 ENCOUNTER — HOSPITAL ENCOUNTER (INPATIENT)
Facility: HOSPITAL | Age: 40
LOS: 1 days | Discharge: ANOTHER HOSPITAL | End: 2025-07-11
Attending: OBSTETRICS & GYNECOLOGY | Admitting: OBSTETRICS & GYNECOLOGY
Payer: COMMERCIAL

## 2025-07-11 ENCOUNTER — ANESTHESIA EVENT (OUTPATIENT)
Dept: OBSTETRICS AND GYNECOLOGY | Facility: HOSPITAL | Age: 40
End: 2025-07-11
Payer: COMMERCIAL

## 2025-07-11 ENCOUNTER — APPOINTMENT (OUTPATIENT)
Dept: OBSTETRICS AND GYNECOLOGY | Facility: CLINIC | Age: 40
End: 2025-07-11
Payer: COMMERCIAL

## 2025-07-11 ENCOUNTER — PROCEDURE VISIT (OUTPATIENT)
Dept: OBSTETRICS AND GYNECOLOGY | Facility: CLINIC | Age: 40
End: 2025-07-11
Payer: COMMERCIAL

## 2025-07-11 VITALS
HEIGHT: 65 IN | BODY MASS INDEX: 46.28 KG/M2 | RESPIRATION RATE: 18 BRPM | TEMPERATURE: 98.2 F | HEART RATE: 77 BPM | DIASTOLIC BLOOD PRESSURE: 87 MMHG | SYSTOLIC BLOOD PRESSURE: 158 MMHG | WEIGHT: 277.78 LBS | OXYGEN SATURATION: 97 %

## 2025-07-11 VITALS — SYSTOLIC BLOOD PRESSURE: 142 MMHG | BODY MASS INDEX: 45.21 KG/M2 | WEIGHT: 278 LBS | DIASTOLIC BLOOD PRESSURE: 94 MMHG

## 2025-07-11 DIAGNOSIS — O11.9 CHRONIC HYPERTENSION WITH SUPERIMPOSED PRE-ECLAMPSIA (HHS-HCC): ICD-10-CM

## 2025-07-11 DIAGNOSIS — M79.89 SWELLING OF LOWER EXTREMITY: Primary | ICD-10-CM

## 2025-07-11 DIAGNOSIS — O99.210 OBESITY IN PREGNANCY (HHS-HCC): ICD-10-CM

## 2025-07-11 DIAGNOSIS — Z3A.35 35 WEEKS GESTATION OF PREGNANCY (HHS-HCC): ICD-10-CM

## 2025-07-11 DIAGNOSIS — F41.9 ANXIETY: ICD-10-CM

## 2025-07-11 DIAGNOSIS — O10.919 CHRONIC HYPERTENSION IN PREGNANCY (HHS-HCC): ICD-10-CM

## 2025-07-11 DIAGNOSIS — O09.899 HISTORY OF PRETERM DELIVERY, CURRENTLY PREGNANT (HHS-HCC): ICD-10-CM

## 2025-07-11 DIAGNOSIS — O09.529 ANTEPARTUM MULTIGRAVIDA OF ADVANCED MATERNAL AGE (HHS-HCC): ICD-10-CM

## 2025-07-11 DIAGNOSIS — R60.1 GENERALIZED EDEMA: ICD-10-CM

## 2025-07-11 DIAGNOSIS — Z87.59 HISTORY OF PRE-ECLAMPSIA: ICD-10-CM

## 2025-07-11 DIAGNOSIS — R73.09 GLUCOSE TOLERANCE TEST ABNORMAL: ICD-10-CM

## 2025-07-11 PROBLEM — Z3A.36 36 WEEKS GESTATION OF PREGNANCY (HHS-HCC): Status: ACTIVE | Noted: 2025-01-02

## 2025-07-11 PROBLEM — K21.9 GASTROESOPHAGEAL REFLUX DISEASE: Status: ACTIVE | Noted: 2025-07-11

## 2025-07-11 LAB
ALBUMIN SERPL BCP-MCNC: 3.4 G/DL (ref 3.4–5)
ALP SERPL-CCNC: 102 U/L (ref 33–110)
ALT SERPL W P-5'-P-CCNC: 13 U/L (ref 7–45)
ANION GAP SERPL CALC-SCNC: 13 MMOL/L (ref 10–20)
AST SERPL W P-5'-P-CCNC: 28 U/L (ref 9–39)
BILIRUB SERPL-MCNC: 0.5 MG/DL (ref 0–1.2)
BUN SERPL-MCNC: 7 MG/DL (ref 6–23)
CALCIUM SERPL-MCNC: 8.6 MG/DL (ref 8.6–10.3)
CHLORIDE SERPL-SCNC: 103 MMOL/L (ref 98–107)
CO2 SERPL-SCNC: 21 MMOL/L (ref 21–32)
CREAT SERPL-MCNC: 0.59 MG/DL (ref 0.5–1.05)
CREAT UR-MCNC: 43.7 MG/DL (ref 20–320)
EGFRCR SERPLBLD CKD-EPI 2021: >90 ML/MIN/1.73M*2
ERYTHROCYTE [DISTWIDTH] IN BLOOD BY AUTOMATED COUNT: 14.1 % (ref 11.5–14.5)
ERYTHROCYTE [DISTWIDTH] IN BLOOD BY AUTOMATED COUNT: 14.2 % (ref 11.5–14.5)
GLUCOSE SERPL-MCNC: 79 MG/DL (ref 74–99)
HCT VFR BLD AUTO: 36.6 % (ref 36–46)
HCT VFR BLD AUTO: 40.6 % (ref 36–46)
HGB BLD-MCNC: 12.2 G/DL (ref 12–16)
HGB BLD-MCNC: 13.1 G/DL (ref 12–16)
HOLD SPECIMEN: NORMAL
LDH SERPL L TO P-CCNC: 294 U/L (ref 84–246)
MCH RBC QN AUTO: 29.6 PG (ref 26–34)
MCH RBC QN AUTO: 30.3 PG (ref 26–34)
MCHC RBC AUTO-ENTMCNC: 32.3 G/DL (ref 32–36)
MCHC RBC AUTO-ENTMCNC: 33.3 G/DL (ref 32–36)
MCV RBC AUTO: 91 FL (ref 80–100)
MCV RBC AUTO: 92 FL (ref 80–100)
NRBC BLD-RTO: 0 /100 WBCS (ref 0–0)
NRBC BLD-RTO: 0 /100 WBCS (ref 0–0)
PLATELET # BLD AUTO: 252 X10*3/UL (ref 150–450)
PLATELET # BLD AUTO: 306 X10*3/UL (ref 150–450)
POC BLOOD, URINE: NEGATIVE
POC GLUCOSE, URINE: NEGATIVE MG/DL
POC KETONES, URINE: NEGATIVE MG/DL
POC LEUKOCYTES, URINE: NEGATIVE
POC NITRITE,URINE: NEGATIVE
POC PROTEIN, URINE: ABNORMAL MG/DL
POTASSIUM SERPL-SCNC: 4.2 MMOL/L (ref 3.5–5.3)
PROT SERPL-MCNC: 6.8 G/DL (ref 6.4–8.2)
PROT UR-ACNC: 7 MG/DL (ref 5–24)
PROT/CREAT UR: 0.16 MG/MG CREAT (ref 0–0.17)
RBC # BLD AUTO: 4.02 X10*6/UL (ref 4–5.2)
RBC # BLD AUTO: 4.43 X10*6/UL (ref 4–5.2)
SODIUM SERPL-SCNC: 133 MMOL/L (ref 136–145)
URATE SERPL-MCNC: 5 MG/DL (ref 2.3–6.7)
WBC # BLD AUTO: 11.6 X10*3/UL (ref 4.4–11.3)
WBC # BLD AUTO: 14.7 X10*3/UL (ref 4.4–11.3)

## 2025-07-11 PROCEDURE — 85027 COMPLETE CBC AUTOMATED: CPT | Performed by: OBSTETRICS & GYNECOLOGY

## 2025-07-11 PROCEDURE — 2500000001 HC RX 250 WO HCPCS SELF ADMINISTERED DRUGS (ALT 637 FOR MEDICARE OP)

## 2025-07-11 PROCEDURE — 80053 COMPREHEN METABOLIC PANEL: CPT | Performed by: OBSTETRICS & GYNECOLOGY

## 2025-07-11 PROCEDURE — 59025 FETAL NON-STRESS TEST: CPT | Performed by: OBSTETRICS & GYNECOLOGY

## 2025-07-11 PROCEDURE — 3E033VJ INTRODUCTION OF OTHER HORMONE INTO PERIPHERAL VEIN, PERCUTANEOUS APPROACH: ICD-10-PCS | Performed by: OBSTETRICS & GYNECOLOGY

## 2025-07-11 PROCEDURE — 7210000002 HC LABOR PER HOUR

## 2025-07-11 PROCEDURE — 2500000002 HC RX 250 W HCPCS SELF ADMINISTERED DRUGS (ALT 637 FOR MEDICARE OP, ALT 636 FOR OP/ED): Performed by: OBSTETRICS & GYNECOLOGY

## 2025-07-11 PROCEDURE — 1120000001 HC OB PRIVATE ROOM DAILY

## 2025-07-11 PROCEDURE — 2500000004 HC RX 250 GENERAL PHARMACY W/ HCPCS (ALT 636 FOR OP/ED): Performed by: OBSTETRICS & GYNECOLOGY

## 2025-07-11 PROCEDURE — 99215 OFFICE O/P EST HI 40 MIN: CPT | Mod: 25

## 2025-07-11 PROCEDURE — 99199 UNLISTED SPECIAL SVC PX/RPRT: CPT

## 2025-07-11 PROCEDURE — 83615 LACTATE (LD) (LDH) ENZYME: CPT | Performed by: OBSTETRICS & GYNECOLOGY

## 2025-07-11 PROCEDURE — 36415 COLL VENOUS BLD VENIPUNCTURE: CPT

## 2025-07-11 PROCEDURE — 84156 ASSAY OF PROTEIN URINE: CPT | Performed by: OBSTETRICS & GYNECOLOGY

## 2025-07-11 PROCEDURE — 0501F PRENATAL FLOW SHEET: CPT | Performed by: OBSTETRICS & GYNECOLOGY

## 2025-07-11 PROCEDURE — 86900 BLOOD TYPING SEROLOGIC ABO: CPT

## 2025-07-11 PROCEDURE — 84550 ASSAY OF BLOOD/URIC ACID: CPT | Performed by: OBSTETRICS & GYNECOLOGY

## 2025-07-11 PROCEDURE — 36415 COLL VENOUS BLD VENIPUNCTURE: CPT | Performed by: OBSTETRICS & GYNECOLOGY

## 2025-07-11 PROCEDURE — 0U7C7DJ DILATION OF CERVIX WITH INTRALUM DEV, TEMP, VIA OPENING: ICD-10-PCS | Performed by: OBSTETRICS & GYNECOLOGY

## 2025-07-11 PROCEDURE — 2500000004 HC RX 250 GENERAL PHARMACY W/ HCPCS (ALT 636 FOR OP/ED)

## 2025-07-11 PROCEDURE — 81002 URINALYSIS NONAUTO W/O SCOPE: CPT | Performed by: OBSTETRICS & GYNECOLOGY

## 2025-07-11 PROCEDURE — 80053 COMPREHEN METABOLIC PANEL: CPT

## 2025-07-11 PROCEDURE — 59050 FETAL MONITOR W/REPORT: CPT

## 2025-07-11 PROCEDURE — 86780 TREPONEMA PALLIDUM: CPT | Mod: GEALAB | Performed by: OBSTETRICS & GYNECOLOGY

## 2025-07-11 PROCEDURE — 96372 THER/PROPH/DIAG INJ SC/IM: CPT

## 2025-07-11 PROCEDURE — 87081 CULTURE SCREEN ONLY: CPT | Mod: GEALAB | Performed by: OBSTETRICS & GYNECOLOGY

## 2025-07-11 PROCEDURE — 85027 COMPLETE CBC AUTOMATED: CPT

## 2025-07-11 RX ORDER — NIFEDIPINE 10 MG/1
10 CAPSULE ORAL ONCE AS NEEDED
Status: DISCONTINUED | OUTPATIENT
Start: 2025-07-11 | End: 2025-07-12

## 2025-07-11 RX ORDER — PENICILLIN G 3000000 [IU]/50ML
3 INJECTION, SOLUTION INTRAVENOUS EVERY 4 HOURS
Status: DISCONTINUED | OUTPATIENT
Start: 2025-07-12 | End: 2025-07-12

## 2025-07-11 RX ORDER — CALCIUM CARBONATE 200(500)MG
1 TABLET,CHEWABLE ORAL EVERY 6 HOURS PRN
Status: DISCONTINUED | OUTPATIENT
Start: 2025-07-11 | End: 2025-07-11 | Stop reason: HOSPADM

## 2025-07-11 RX ORDER — NIFEDIPINE 30 MG/1
30 TABLET, FILM COATED, EXTENDED RELEASE ORAL
Status: DISCONTINUED | OUTPATIENT
Start: 2025-07-12 | End: 2025-07-12

## 2025-07-11 RX ORDER — LABETALOL 100 MG/1
300 TABLET, FILM COATED ORAL 2 TIMES DAILY
Status: DISCONTINUED | OUTPATIENT
Start: 2025-07-11 | End: 2025-07-11 | Stop reason: HOSPADM

## 2025-07-11 RX ORDER — LIDOCAINE HYDROCHLORIDE 10 MG/ML
20 INJECTION, SOLUTION INFILTRATION; PERINEURAL ONCE AS NEEDED
Status: DISCONTINUED | OUTPATIENT
Start: 2025-07-11 | End: 2025-07-11 | Stop reason: HOSPADM

## 2025-07-11 RX ORDER — METHYLERGONOVINE MALEATE 0.2 MG/ML
0.2 INJECTION INTRAVENOUS ONCE AS NEEDED
Status: DISCONTINUED | OUTPATIENT
Start: 2025-07-11 | End: 2025-07-11 | Stop reason: HOSPADM

## 2025-07-11 RX ORDER — LOPERAMIDE HYDROCHLORIDE 2 MG/1
4 CAPSULE ORAL EVERY 2 HOUR PRN
Status: DISCONTINUED | OUTPATIENT
Start: 2025-07-11 | End: 2025-07-12

## 2025-07-11 RX ORDER — MISOPROSTOL 200 UG/1
800 TABLET ORAL ONCE AS NEEDED
Status: COMPLETED | OUTPATIENT
Start: 2025-07-11 | End: 2025-07-12

## 2025-07-11 RX ORDER — ONDANSETRON HYDROCHLORIDE 2 MG/ML
4 INJECTION, SOLUTION INTRAVENOUS EVERY 6 HOURS PRN
Status: DISCONTINUED | OUTPATIENT
Start: 2025-07-11 | End: 2025-07-12

## 2025-07-11 RX ORDER — TERBUTALINE SULFATE 1 MG/ML
0.25 INJECTION SUBCUTANEOUS ONCE AS NEEDED
Status: DISCONTINUED | OUTPATIENT
Start: 2025-07-11 | End: 2025-07-12

## 2025-07-11 RX ORDER — OXYTOCIN 10 [USP'U]/ML
10 INJECTION, SOLUTION INTRAMUSCULAR; INTRAVENOUS ONCE AS NEEDED
Status: DISCONTINUED | OUTPATIENT
Start: 2025-07-11 | End: 2025-07-11 | Stop reason: HOSPADM

## 2025-07-11 RX ORDER — LABETALOL HYDROCHLORIDE 5 MG/ML
80 INJECTION, SOLUTION INTRAVENOUS ONCE AS NEEDED
Status: COMPLETED | OUTPATIENT
Start: 2025-07-11 | End: 2025-07-11

## 2025-07-11 RX ORDER — CALCIUM CARBONATE 200(500)MG
1 TABLET,CHEWABLE ORAL EVERY 6 HOURS PRN
Status: DISCONTINUED | OUTPATIENT
Start: 2025-07-11 | End: 2025-07-12

## 2025-07-11 RX ORDER — METHYLERGONOVINE MALEATE 0.2 MG/ML
0.2 INJECTION INTRAVENOUS ONCE AS NEEDED
Status: DISCONTINUED | OUTPATIENT
Start: 2025-07-11 | End: 2025-07-12

## 2025-07-11 RX ORDER — HYDRALAZINE HYDROCHLORIDE 20 MG/ML
10 INJECTION INTRAMUSCULAR; INTRAVENOUS ONCE
Status: COMPLETED | OUTPATIENT
Start: 2025-07-11 | End: 2025-07-11

## 2025-07-11 RX ORDER — LABETALOL HYDROCHLORIDE 5 MG/ML
INJECTION, SOLUTION INTRAVENOUS
Status: COMPLETED
Start: 2025-07-11 | End: 2025-07-11

## 2025-07-11 RX ORDER — MISOPROSTOL 200 UG/1
800 TABLET ORAL ONCE AS NEEDED
Status: DISCONTINUED | OUTPATIENT
Start: 2025-07-11 | End: 2025-07-11 | Stop reason: HOSPADM

## 2025-07-11 RX ORDER — CARBOPROST TROMETHAMINE 250 UG/ML
250 INJECTION, SOLUTION INTRAMUSCULAR ONCE AS NEEDED
Status: DISCONTINUED | OUTPATIENT
Start: 2025-07-11 | End: 2025-07-11 | Stop reason: HOSPADM

## 2025-07-11 RX ORDER — OXYTOCIN/0.9 % SODIUM CHLORIDE 30/500 ML
60 PLASTIC BAG, INJECTION (ML) INTRAVENOUS ONCE AS NEEDED
Status: COMPLETED | OUTPATIENT
Start: 2025-07-11 | End: 2025-07-12

## 2025-07-11 RX ORDER — OXYTOCIN 10 [USP'U]/ML
10 INJECTION, SOLUTION INTRAMUSCULAR; INTRAVENOUS ONCE AS NEEDED
Status: DISCONTINUED | OUTPATIENT
Start: 2025-07-11 | End: 2025-07-12

## 2025-07-11 RX ORDER — CARBOPROST TROMETHAMINE 250 UG/ML
250 INJECTION, SOLUTION INTRAMUSCULAR ONCE AS NEEDED
Status: DISCONTINUED | OUTPATIENT
Start: 2025-07-11 | End: 2025-07-12

## 2025-07-11 RX ORDER — SODIUM CHLORIDE, SODIUM LACTATE, POTASSIUM CHLORIDE, CALCIUM CHLORIDE 600; 310; 30; 20 MG/100ML; MG/100ML; MG/100ML; MG/100ML
75 INJECTION, SOLUTION INTRAVENOUS CONTINUOUS
Status: DISCONTINUED | OUTPATIENT
Start: 2025-07-11 | End: 2025-07-12

## 2025-07-11 RX ORDER — ONDANSETRON 4 MG/1
4 TABLET, FILM COATED ORAL EVERY 6 HOURS PRN
Status: DISCONTINUED | OUTPATIENT
Start: 2025-07-11 | End: 2025-07-12

## 2025-07-11 RX ORDER — CALCIUM GLUCONATE 98 MG/ML
1 INJECTION, SOLUTION INTRAVENOUS ONCE AS NEEDED
Status: DISCONTINUED | OUTPATIENT
Start: 2025-07-11 | End: 2025-07-11 | Stop reason: HOSPADM

## 2025-07-11 RX ORDER — MAGNESIUM SULFATE HEPTAHYDRATE 40 MG/ML
2 INJECTION, SOLUTION INTRAVENOUS CONTINUOUS
Status: DISCONTINUED | OUTPATIENT
Start: 2025-07-11 | End: 2025-07-11 | Stop reason: HOSPADM

## 2025-07-11 RX ORDER — NIFEDIPINE 30 MG/1
30 TABLET, FILM COATED, EXTENDED RELEASE ORAL DAILY
Status: DISCONTINUED | OUTPATIENT
Start: 2025-07-11 | End: 2025-07-11 | Stop reason: HOSPADM

## 2025-07-11 RX ORDER — SODIUM CHLORIDE, SODIUM LACTATE, POTASSIUM CHLORIDE, CALCIUM CHLORIDE 600; 310; 30; 20 MG/100ML; MG/100ML; MG/100ML; MG/100ML
75 INJECTION, SOLUTION INTRAVENOUS CONTINUOUS
Status: DISCONTINUED | OUTPATIENT
Start: 2025-07-11 | End: 2025-07-11 | Stop reason: HOSPADM

## 2025-07-11 RX ORDER — LABETALOL HYDROCHLORIDE 5 MG/ML
20 INJECTION, SOLUTION INTRAVENOUS ONCE AS NEEDED
Status: COMPLETED | OUTPATIENT
Start: 2025-07-11 | End: 2025-07-12

## 2025-07-11 RX ORDER — LOPERAMIDE HYDROCHLORIDE 2 MG/1
4 CAPSULE ORAL EVERY 2 HOUR PRN
Status: DISCONTINUED | OUTPATIENT
Start: 2025-07-11 | End: 2025-07-11 | Stop reason: HOSPADM

## 2025-07-11 RX ORDER — LABETALOL HYDROCHLORIDE 5 MG/ML
20 INJECTION, SOLUTION INTRAVENOUS ONCE
Status: COMPLETED | OUTPATIENT
Start: 2025-07-11 | End: 2025-07-11

## 2025-07-11 RX ORDER — MAGNESIUM SULFATE HEPTAHYDRATE 40 MG/ML
2 INJECTION, SOLUTION INTRAVENOUS CONTINUOUS
Status: DISCONTINUED | OUTPATIENT
Start: 2025-07-11 | End: 2025-07-13

## 2025-07-11 RX ORDER — OXYTOCIN/0.9 % SODIUM CHLORIDE 30/500 ML
60 PLASTIC BAG, INJECTION (ML) INTRAVENOUS ONCE AS NEEDED
Status: DISCONTINUED | OUTPATIENT
Start: 2025-07-11 | End: 2025-07-11 | Stop reason: HOSPADM

## 2025-07-11 RX ORDER — SERTRALINE HYDROCHLORIDE 100 MG/1
100 TABLET, FILM COATED ORAL DAILY
Status: DISCONTINUED | OUTPATIENT
Start: 2025-07-12 | End: 2025-07-15 | Stop reason: HOSPADM

## 2025-07-11 RX ORDER — DIPHENHYDRAMINE HCL 25 MG
25 CAPSULE ORAL EVERY 6 HOURS PRN
Status: DISCONTINUED | OUTPATIENT
Start: 2025-07-11 | End: 2025-07-12

## 2025-07-11 RX ORDER — ONDANSETRON 4 MG/1
4 TABLET, FILM COATED ORAL EVERY 6 HOURS PRN
Status: DISCONTINUED | OUTPATIENT
Start: 2025-07-11 | End: 2025-07-11 | Stop reason: HOSPADM

## 2025-07-11 RX ORDER — HYDRALAZINE HYDROCHLORIDE 20 MG/ML
5 INJECTION INTRAMUSCULAR; INTRAVENOUS ONCE AS NEEDED
Status: DISCONTINUED | OUTPATIENT
Start: 2025-07-11 | End: 2025-07-12

## 2025-07-11 RX ORDER — OXYTOCIN/0.9 % SODIUM CHLORIDE 30/500 ML
2-30 PLASTIC BAG, INJECTION (ML) INTRAVENOUS CONTINUOUS
Status: DISCONTINUED | OUTPATIENT
Start: 2025-07-11 | End: 2025-07-12

## 2025-07-11 RX ORDER — LIDOCAINE HYDROCHLORIDE 10 MG/ML
30 INJECTION, SOLUTION INFILTRATION; PERINEURAL ONCE AS NEEDED
Status: DISCONTINUED | OUTPATIENT
Start: 2025-07-11 | End: 2025-07-12

## 2025-07-11 RX ORDER — TRANEXAMIC ACID 1 G/10ML
1000 INJECTION, SOLUTION INTRAVENOUS ONCE AS NEEDED
Status: DISCONTINUED | OUTPATIENT
Start: 2025-07-11 | End: 2025-07-11 | Stop reason: HOSPADM

## 2025-07-11 RX ORDER — BETAMETHASONE SODIUM PHOSPHATE AND BETAMETHASONE ACETATE 3; 3 MG/ML; MG/ML
12 INJECTION, SUSPENSION INTRA-ARTICULAR; INTRALESIONAL; INTRAMUSCULAR; SOFT TISSUE EVERY 24 HOURS
Status: DISCONTINUED | OUTPATIENT
Start: 2025-07-11 | End: 2025-07-11 | Stop reason: HOSPADM

## 2025-07-11 RX ORDER — ONDANSETRON HYDROCHLORIDE 2 MG/ML
4 INJECTION, SOLUTION INTRAVENOUS EVERY 6 HOURS PRN
Status: DISCONTINUED | OUTPATIENT
Start: 2025-07-11 | End: 2025-07-11 | Stop reason: HOSPADM

## 2025-07-11 RX ORDER — FENTANYL CITRATE 50 UG/ML
25 INJECTION, SOLUTION INTRAMUSCULAR; INTRAVENOUS ONCE
Status: COMPLETED | OUTPATIENT
Start: 2025-07-11 | End: 2025-07-11

## 2025-07-11 RX ORDER — HYDRALAZINE HYDROCHLORIDE 20 MG/ML
10 INJECTION INTRAMUSCULAR; INTRAVENOUS ONCE AS NEEDED
Status: COMPLETED | OUTPATIENT
Start: 2025-07-11 | End: 2025-07-11

## 2025-07-11 RX ORDER — TERBUTALINE SULFATE 1 MG/ML
0.25 INJECTION SUBCUTANEOUS ONCE AS NEEDED
Status: DISCONTINUED | OUTPATIENT
Start: 2025-07-11 | End: 2025-07-11 | Stop reason: HOSPADM

## 2025-07-11 RX ORDER — TRANEXAMIC ACID 1 G/10ML
1000 INJECTION, SOLUTION INTRAVENOUS ONCE AS NEEDED
Status: DISCONTINUED | OUTPATIENT
Start: 2025-07-11 | End: 2025-07-12

## 2025-07-11 RX ORDER — NIFEDIPINE 30 MG/1
30 TABLET, FILM COATED, EXTENDED RELEASE ORAL
Status: DISCONTINUED | OUTPATIENT
Start: 2025-07-12 | End: 2025-07-11

## 2025-07-11 RX ORDER — LABETALOL HYDROCHLORIDE 5 MG/ML
40 INJECTION, SOLUTION INTRAVENOUS ONCE AS NEEDED
Status: COMPLETED | OUTPATIENT
Start: 2025-07-11 | End: 2025-07-11

## 2025-07-11 RX ORDER — ACETAMINOPHEN 325 MG/1
975 TABLET ORAL EVERY 6 HOURS PRN
Status: DISCONTINUED | OUTPATIENT
Start: 2025-07-11 | End: 2025-07-12

## 2025-07-11 RX ADMIN — LABETALOL HYDROCHLORIDE 300 MG: 200 TABLET, FILM COATED ORAL at 22:45

## 2025-07-11 RX ADMIN — LABETALOL HYDROCHLORIDE 20 MG: 5 INJECTION, SOLUTION INTRAVENOUS at 14:49

## 2025-07-11 RX ADMIN — BETAMETHASONE SODIUM PHOSPHATE AND BETAMETHASONE ACETATE 12 MG: 3; 3 INJECTION, SUSPENSION INTRA-ARTICULAR; INTRALESIONAL; INTRAMUSCULAR at 15:20

## 2025-07-11 RX ADMIN — HYDRALAZINE HYDROCHLORIDE 10 MG: 20 INJECTION, SOLUTION INTRAMUSCULAR; INTRAVENOUS at 15:51

## 2025-07-11 RX ADMIN — ANTACID TABLETS 1 TABLET: 500 TABLET, CHEWABLE ORAL at 23:52

## 2025-07-11 RX ADMIN — LABETALOL HYDROCHLORIDE 80 MG: 5 INJECTION, SOLUTION INTRAVENOUS at 15:15

## 2025-07-11 RX ADMIN — SODIUM CHLORIDE, SODIUM LACTATE, POTASSIUM CHLORIDE, AND CALCIUM CHLORIDE 75 ML/HR: .6; .31; .03; .02 INJECTION, SOLUTION INTRAVENOUS at 14:53

## 2025-07-11 RX ADMIN — LABETALOL HYDROCHLORIDE 40 MG: 5 INJECTION, SOLUTION INTRAVENOUS at 15:02

## 2025-07-11 RX ADMIN — LABETALOL HYDROCHLORIDE 40 MG: 5 INJECTION INTRAVENOUS at 15:02

## 2025-07-11 RX ADMIN — MAGNESIUM SULFATE HEPTAHYDRATE 2 G/HR: 40 INJECTION, SOLUTION INTRAVENOUS at 23:26

## 2025-07-11 RX ADMIN — HYDRALAZINE HYDROCHLORIDE 10 MG: 20 INJECTION, SOLUTION INTRAMUSCULAR; INTRAVENOUS at 15:28

## 2025-07-11 RX ADMIN — LABETALOL HYDROCHLORIDE 80 MG: 5 INJECTION INTRAVENOUS at 15:15

## 2025-07-11 RX ADMIN — FENTANYL CITRATE 25 MCG: 50 INJECTION INTRAMUSCULAR; INTRAVENOUS at 23:32

## 2025-07-11 RX ADMIN — PENICILLIN G POTASSIUM 5 MILLION UNITS: 5000000 INJECTION, POWDER, FOR SOLUTION INTRAMUSCULAR; INTRAVENOUS at 23:54

## 2025-07-11 RX ADMIN — NIFEDIPINE 30 MG: 30 TABLET, FILM COATED, EXTENDED RELEASE ORAL at 16:39

## 2025-07-11 RX ADMIN — ACETAMINOPHEN 975 MG: 325 TABLET ORAL at 23:52

## 2025-07-11 RX ADMIN — MAGNESIUM SULFATE HEPTAHYDRATE 2 G/HR: 40 INJECTION, SOLUTION INTRAVENOUS at 15:38

## 2025-07-11 SDOH — ECONOMIC STABILITY: FOOD INSECURITY: HOW HARD IS IT FOR YOU TO PAY FOR THE VERY BASICS LIKE FOOD, HOUSING, MEDICAL CARE, AND HEATING?: NOT HARD AT ALL

## 2025-07-11 SDOH — SOCIAL STABILITY: SOCIAL INSECURITY
WITHIN THE LAST YEAR, HAVE YOU BEEN KICKED, HIT, SLAPPED, OR OTHERWISE PHYSICALLY HURT BY YOUR PARTNER OR EX-PARTNER?: NO

## 2025-07-11 SDOH — HEALTH STABILITY: MENTAL HEALTH: WISH TO BE DEAD (PAST 1 MONTH): NO

## 2025-07-11 SDOH — SOCIAL STABILITY: SOCIAL INSECURITY: WITHIN THE LAST YEAR, HAVE YOU BEEN AFRAID OF YOUR PARTNER OR EX-PARTNER?: NO

## 2025-07-11 SDOH — ECONOMIC STABILITY: FOOD INSECURITY: WITHIN THE PAST 12 MONTHS, THE FOOD YOU BOUGHT JUST DIDN'T LAST AND YOU DIDN'T HAVE MONEY TO GET MORE.: NEVER TRUE

## 2025-07-11 SDOH — SOCIAL STABILITY: SOCIAL INSECURITY
WITHIN THE LAST YEAR, HAVE YOU BEEN RAPED OR FORCED TO HAVE ANY KIND OF SEXUAL ACTIVITY BY YOUR PARTNER OR EX-PARTNER?: NO

## 2025-07-11 SDOH — HEALTH STABILITY: MENTAL HEALTH: NON-SPECIFIC ACTIVE SUICIDAL THOUGHTS (PAST 1 MONTH): NO

## 2025-07-11 SDOH — SOCIAL STABILITY: SOCIAL INSECURITY: HAVE YOU HAD THOUGHTS OF HARMING ANYONE ELSE?: NO

## 2025-07-11 SDOH — ECONOMIC STABILITY: FOOD INSECURITY: WITHIN THE PAST 12 MONTHS, YOU WORRIED THAT YOUR FOOD WOULD RUN OUT BEFORE YOU GOT THE MONEY TO BUY MORE.: NEVER TRUE

## 2025-07-11 SDOH — ECONOMIC STABILITY: HOUSING INSECURITY: DO YOU FEEL UNSAFE GOING BACK TO THE PLACE WHERE YOU ARE LIVING?: NO

## 2025-07-11 SDOH — SOCIAL STABILITY: SOCIAL INSECURITY: HAS ANYONE EVER THREATENED TO HURT YOUR FAMILY OR YOUR PETS?: NO

## 2025-07-11 SDOH — HEALTH STABILITY: MENTAL HEALTH: SUICIDAL BEHAVIOR (LIFETIME): NO

## 2025-07-11 SDOH — ECONOMIC STABILITY: TRANSPORTATION INSECURITY: IN THE PAST 12 MONTHS, HAS LACK OF TRANSPORTATION KEPT YOU FROM MEDICAL APPOINTMENTS OR FROM GETTING MEDICATIONS?: NO

## 2025-07-11 SDOH — HEALTH STABILITY: MENTAL HEALTH: WERE YOU ABLE TO COMPLETE ALL THE BEHAVIORAL HEALTH SCREENINGS?: YES

## 2025-07-11 SDOH — SOCIAL STABILITY: SOCIAL INSECURITY: PHYSICAL ABUSE: DENIES

## 2025-07-11 SDOH — SOCIAL STABILITY: SOCIAL INSECURITY: WITHIN THE LAST YEAR, HAVE YOU BEEN HUMILIATED OR EMOTIONALLY ABUSED IN OTHER WAYS BY YOUR PARTNER OR EX-PARTNER?: NO

## 2025-07-11 SDOH — SOCIAL STABILITY: SOCIAL INSECURITY: HAVE YOU HAD ANY THOUGHTS OF HARMING ANYONE ELSE?: NO

## 2025-07-11 SDOH — SOCIAL STABILITY: SOCIAL INSECURITY: VERBAL ABUSE: DENIES

## 2025-07-11 SDOH — SOCIAL STABILITY: SOCIAL INSECURITY: ABUSE SCREEN: ADULT

## 2025-07-11 SDOH — SOCIAL STABILITY: SOCIAL INSECURITY: ARE YOU OR HAVE YOU BEEN THREATENED OR ABUSED PHYSICALLY, EMOTIONALLY, OR SEXUALLY BY ANYONE?: NO

## 2025-07-11 SDOH — SOCIAL STABILITY: SOCIAL INSECURITY: DOES ANYONE TRY TO KEEP YOU FROM HAVING/CONTACTING OTHER FRIENDS OR DOING THINGS OUTSIDE YOUR HOME?: NO

## 2025-07-11 SDOH — HEALTH STABILITY: MENTAL HEALTH: HAVE YOU USED ANY SUBSTANCES (CANABIS, COCAINE, HEROIN, HALLUCINOGENS, INHALANTS, ETC.) IN THE PAST 12 MONTHS?: NO

## 2025-07-11 SDOH — SOCIAL STABILITY: SOCIAL INSECURITY: DO YOU FEEL ANYONE HAS EXPLOITED OR TAKEN ADVANTAGE OF YOU FINANCIALLY OR OF YOUR PERSONAL PROPERTY?: NO

## 2025-07-11 SDOH — HEALTH STABILITY: MENTAL HEALTH: HAVE YOU USED ANY PRESCRIPTION DRUGS OTHER THAN PRESCRIBED IN THE PAST 12 MONTHS?: NO

## 2025-07-11 SDOH — SOCIAL STABILITY: SOCIAL INSECURITY: ARE THERE ANY APPARENT SIGNS OF INJURIES/BEHAVIORS THAT COULD BE RELATED TO ABUSE/NEGLECT?: NO

## 2025-07-11 ASSESSMENT — PATIENT HEALTH QUESTIONNAIRE - PHQ9
SUM OF ALL RESPONSES TO PHQ9 QUESTIONS 1 & 2: 0
1. LITTLE INTEREST OR PLEASURE IN DOING THINGS: NOT AT ALL
2. FEELING DOWN, DEPRESSED OR HOPELESS: NOT AT ALL
SUM OF ALL RESPONSES TO PHQ9 QUESTIONS 1 & 2: 0
1. LITTLE INTEREST OR PLEASURE IN DOING THINGS: NOT AT ALL
2. FEELING DOWN, DEPRESSED OR HOPELESS: NOT AT ALL

## 2025-07-11 ASSESSMENT — PAIN SCALES - GENERAL
PAINLEVEL_OUTOF10: 0 - NO PAIN

## 2025-07-11 ASSESSMENT — LIFESTYLE VARIABLES
AUDIT-C TOTAL SCORE: 0
AUDIT-C TOTAL SCORE: 0
HOW OFTEN DO YOU HAVE 6 OR MORE DRINKS ON ONE OCCASION: NEVER
HOW MANY STANDARD DRINKS CONTAINING ALCOHOL DO YOU HAVE ON A TYPICAL DAY: PATIENT DOES NOT DRINK
HOW OFTEN DO YOU HAVE A DRINK CONTAINING ALCOHOL: NEVER
HOW MANY STANDARD DRINKS CONTAINING ALCOHOL DO YOU HAVE ON A TYPICAL DAY: PATIENT DOES NOT DRINK
SKIP TO QUESTIONS 9-10: 1
AUDIT-C TOTAL SCORE: 0
HOW OFTEN DO YOU HAVE 6 OR MORE DRINKS ON ONE OCCASION: NEVER
SKIP TO QUESTIONS 9-10: 1
AUDIT-C TOTAL SCORE: 0
HOW OFTEN DO YOU HAVE A DRINK CONTAINING ALCOHOL: NEVER

## 2025-07-11 ASSESSMENT — ACTIVITIES OF DAILY LIVING (ADL)
LACK_OF_TRANSPORTATION: NO
LACK_OF_TRANSPORTATION: NO

## 2025-07-11 NOTE — SIGNIFICANT EVENT
KATHRYNM consulted through the transfer center Dr. Nichols and Dr. Mcmillan. I was doing a delivery when she called. One of the nurses Patricia gave the situation and they were able to access the chart.  Transfer accepted. Recommend beginning Nifedipine 30 XL.  Last /82, it was discovered that IV infiltrated and was just replaced. It is likely that some of the medication did not get in the vein.  BP now 149/80.

## 2025-07-11 NOTE — PROGRESS NOTES
Subjective   Patient ID [id number]   Sarahy Rehman is a 39 y.o.  at 35w4d with a working estimated date of delivery of 2025, by Ultrasound who presents for a routine prenatal visit. She denies vaginal bleeding, leakage of fluid, decreased fetal movements, or contractions.    Her pregnancy is complicated by:  CHTN  Obesity  AMA  Hx SPEC  Failed 1 hour GTT/passed 3 hour  Hx PTB  Anxiety    Objective   Physical Exam:   Weight: 126 kg (278 lb)  Expected Total Weight Gain: 5 kg (11 lb)-9 kg (19 lb)   Pregravid BMI: 44.76  BP: (!) 142/94  Fetal Heart Rate: 145               Prenatal Labs  Urine Dip:  Lab Results   Component Value Date    KETONESU NEGATIVE 2025     Lab Results   Component Value Date    HGB 12.2 2025    HCT 36.6 2025    ABO O 2024    HEPBSAG Nonreactive 2024           Imaging: growth US next week      Assessment/Plan   Problem List Items Addressed This Visit           ICD-10-CM    36 weeks gestation of pregnancy ([facility]) Z3A.36    Antepartum multigravida of advanced maternal age ([facility]) O09.529    Anxiety F41.9    Chronic hypertension in pregnancy ([facility]) O10.919    Relevant Orders    Fetal nonstress test    Glucose tolerance test abnormal R73.09    History of pre-eclampsia Z87.59    History of  delivery, currently pregnant ([facility]) O09.899    Obesity in pregnancy ([facility]) O99.210     Medical Problems       Problem List       36 weeks gestation of pregnancy ([facility])    Overview Addendum 2025  4:29 PM by Ivonne Merlos MD   38 yo     1st Trimester  [x]  OB profile/lab work 24 NL, BLOOD TYPE IS O+  [x]  Pap 1-15-25  NEG HPV NEG  [x]  GC DNA probe 1-15-25 NEG/NEG  [x]  Urine culture 1-15-25 NEG  [x]  Early A1c (BMI 30+) 24 5.4/108  [x]  Aneuploidy screening (Prequel 10+ wk/First Trimester Check 11-14 wk) 1/15/25 PREQUEL NEG  [x]  Carrier screening 1/15/25 FORESIGHT NEG  [x]  First trimester anatomy US/NT - 25: 13w2d, CRL  c/w SHA, normal organ survey, NT subjectively normal.     2nd Trimester  [x]  Anatomy US (19-21 wks) 3/27/25: 20w2d, biometry c/w GA, posterior placenta w/o previa, no malformations but views of spine, aortic arch, SVC/IVC incomplete. Follow up in 2 weeks.  Ultrasound 4/18/25 at 23w3d. Normal growth. Anatomy views completed. Follow up at 28 weeks.  Ultrasound 5/28/25 at 29w1d. Normal growth, 66%. BPP 8/8. Follow up at 32 weeks.  Ultrasound 6/20/25 at 32w3d. Normal growth, EFW 58%. BPP 8/8. Follow up in 4 weeks.  [x]  1 hour Glucose (25-28 wks) 5-16-25 148 - FAILED  [x]  CBC (25-28 wks) ORDERED 5-16-25 12.6/36.6  [x]  3 hour GTT (if needed) 5-22-25 95/145/104/74 - PASSED    3rd Trimester  []  GBS (36-37 wks)  []  L&D consent  []  TOLAC consent (if needed)    Vaccines  [x]  COVID 2021 x3, 2022 x1  [x]  Flu (September to May) October 2024  [x]  Tdap (27-36 wks) 6/6/25  []  RSV (32-36 wks Sept-Jan)             Chronic hypertension in pregnancy ([facility])    Overview Addendum 6/18/2025  8:51 AM by BRADEN Montes De Oca-CNP   Meds: Labetalol  [x]  Baseline HELLP labs negative  [x]  Baseline UPCr 0.07  [x]   mg/day starting at 12 weeks GA Taking as advised  [x]  MFM consult (if complicated/appropriate) Referral placed 02/14  [x]  US for growth (28/30/36 wks if meds)  [x]  NST/BPP weekly starting at 32 weeks (meds)    Delivery recommended: 81s7z-65o8b no meds, 69b5h-54p9g meds           Anxiety    Overview Signed 3/14/2025  4:32 PM by Rosey Whitley, DO   Stable on Zoloft, monitor closely for acute worsening         Obesity in pregnancy ([facility])    Overview Addendum 6/18/2025  8:52 AM by BRADEN Montes De Oca-CNP   [x]  BMI at ENOB 43.62  [x]  Baseline A1c 5.4/108  [x]  NST: Started at 32 weeks for CHTN/meds  [x]  Growth US 30 and 36 wks  If BMI 50+ at any point in pregnancy, delivery at [facility]  Delivery recommended 39w0d to 39w6d             Antepartum multigravida of advanced maternal age ([facility])     Overview Addendum 2025  8:52 AM by BRADEN Montes De Oca-CNP   [x]  Aneuploidy screening offered - Prequel NIPT ordered 1/15/25           History of pre-eclampsia    Overview Addendum 1/15/2025  9:59 AM by Ivonne Merlos MD   Superimposed sPEC in G1.   Delivery at 34 weeks.  Baseline PIH labs - normal; UPC 0.07         History of  delivery, currently pregnant ([facility])    Overview Signed 1/15/2025 10:01 AM by Ivonne Merlos MD   34 week delivery in G1  IOL for CHTN with superimposed PEC         Glucose tolerance test abnormal    Overview Signed 2025  4:08 PM by Rosey Whitley DO   1 hour   [x]  3 hour GTT PASSED         Chronic hypertension with superimposed pre-eclampsia ([facility])          Continue prenatal vitamin.  Labs reviewed.  GBS 36 weeks  Expected mode of delivery TBD  Sent to L&D for worsening BP on meds  Rosey Whitley DO

## 2025-07-11 NOTE — PROCEDURES
Sarahy Rehman, a  at 35w4d with an SHA of 2025, by Ultrasound, was seen at [facility] for a nonstress test.    Non-Stress Test   Baseline Fetal Heart Rate for Non-Stress Test: 145 BPM  Variability in Waveform for Non-Stress Test: Moderate  Accelerations in Non-Stress Test: Yes  Decelerations in Non-Stress Test: None  Contractions in Non-Stress Test: Not present  Acoustic Stimulator for Non-Stress Test: No  Interpretation of Non-Stress Test   Interpretation of Non-Stress Test: Reactive  Comments on Non-Stress Test: Reassuring            Rosey Whitley, DO

## 2025-07-11 NOTE — H&P
OB Admission H&P    Assessment/Plan    Sarahy Rehman is a 39 y.o.  at 35w4d. SHA: 2025, by Ultrasound.     Diagnosis: Chronic Hypertension with Superimposed Preeclampsia  -Diagnosed based on: severe range blood pressures requiring immediate treatment  -Blood pressure goal <160/110  -Short acting medications received? IV labetalol, dose given: 20   -Long acting antihypertensive: On Labetalol 300mg bid at home  -Preeclampsia labs: Pending    Plan  -Admit to L&D, consented  - corticosteroids: indicated  -Magnesium for seizure prophylaxis: indicated  -GBS prophylaxis: GBS drawn.  -Will call transfer line    Fetal Status  -NST reactive, reassuring   -Presentation vtx based on vaginal exam  -EFW 5.5 by recent ultrasound  -BMZ: indicated  -GBS pending        Pregnancy Problems (from 24 to present)       Problem Noted Diagnosed Resolved    Chronic hypertension with superimposed pre-eclampsia ([facility]) 2025 by Bonny Osborne MD  No    Priority:  Medium       Glucose tolerance test abnormal 2025 by Rosey Whitley DO  No    Priority:  Medium       Overview Signed 2025  4:08 PM by Rosey Whitley DO   1 hour   [x]  3 hour GTT PASSED         History of  delivery, currently pregnant ([facility]) 1/15/2025 by Ivonne Merlos MD  No    Priority:  Medium       Overview Signed 1/15/2025 10:01 AM by Ivonne Merlos MD   34 week delivery in G1  IOL for CHTN with superimposed PEC         Antepartum multigravida of advanced maternal age ([facility]) 2025 by Lisa Ravi RN  No    Priority:  Medium       Overview Addendum 2025  8:52 AM by BRADEN Montes De Oca-CNP   [x]  Aneuploidy screening offered - Prequel NIPT ordered 1/15/25           History of pre-eclampsia 2025 by Lisa Ravi RN  No    Priority:  Medium       Overview Addendum 1/15/2025  9:59 AM by Ivonne Merlos MD   Superimposed sPEC in G1.   Delivery at 34 weeks.  Baseline PIH  labs - normal; UPC 0.07         36 weeks gestation of pregnancy ([facility]) 2025 by Mike Payton MA  No    Priority:  Medium       Overview Addendum 2025  4:29 PM by Ivonne Merlos MD   40 yo     1st Trimester  [x]  OB profile/lab work 24 NL, BLOOD TYPE IS O+  [x]  Pap 1-15-25  NEG HPV NEG  [x]  GC DNA probe 1-15-25 NEG/NEG  [x]  Urine culture 1-15-25 NEG  [x]  Early A1c (BMI 30+) 24 5.4/108  [x]  Aneuploidy screening (Prequel 10+ wk/First Trimester Check 11-14 wk) 1/15/25 PREQUEL NEG  [x]  Carrier screening 1/15/25 FORESIGHT NEG  [x]  First trimester anatomy US/NT - 25: 13w2d, CRL c/w SHA, normal organ survey, NT subjectively normal.     2nd Trimester  [x]  Anatomy US (19-21 wks) 3/27/25: 20w2d, biometry c/w GA, posterior placenta w/o previa, no malformations but views of spine, aortic arch, SVC/IVC incomplete. Follow up in 2 weeks.  Ultrasound 25 at 23w3d. Normal growth. Anatomy views completed. Follow up at 28 weeks.  Ultrasound 25 at 29w1d. Normal growth, 66%. BPP 8/8. Follow up at 32 weeks.  Ultrasound 25 at 32w3d. Normal growth, EFW 58%. BPP 8/8. Follow up in 4 weeks.  [x]  1 hour Glucose (25-28 wks) 25 148 - FAILED  [x]  CBC (25-28 wks) ORDERED 25 12.6/36.6  [x]  3 hour GTT (if needed) 25 95/145/104/74 - PASSED    3rd Trimester  []  GBS (36-37 wks)  []  L&D consent  []  TOLAC consent (if needed)    Vaccines  [x]  COVID 2021 x3, 2022 x1  [x]  Flu (September to May) 2024  [x]  Tdap (27-36 wks) 25  []  RSV (32-36 wks Sept-)             Obesity in pregnancy ([facility]) 2024 by Destiny Chandra CMA  No    Priority:  Medium       Overview Addendum 2025  8:52 AM by BRADEN Montes De Oca-CNP   [x]  BMI at ENOB 43.62  [x]  Baseline A1c 5.4/108  [x]  NST: Started at 32 weeks for CHTN/meds  [x]  Growth US 30 and 36 wks  If BMI 50+ at any point in pregnancy, delivery at [facility]  Delivery recommended 39w0d to 39w6d              Anxiety 2023 by Aguilar Rincon, DO  No    Priority:  Medium       Overview Signed 3/14/2025  4:32 PM by Rosey Whitley,    Stable on Zoloft, monitor closely for acute worsening         Chronic hypertension in pregnancy ([facility]) 10/13/2023 by BRADEN Pringle-CNP  No    Priority:  Medium       Overview Addendum 2025  8:51 AM by Dilia Skelton, BRADEN-CNP   Meds: Labetalol  [x]  Baseline HELLP labs negative  [x]  Baseline UPCr 0.07  [x]   mg/day starting at 12 weeks GA Taking as advised  [x]  MFM consult (if complicated/appropriate) Referral placed   [x]  US for growth (28/30/36 wks if meds)  [x]  NST/BPP weekly starting at 32 weeks (meds)    Delivery recommended: 62p4h-69k6d no meds, 39h1t-34u5y meds                   Subjective   38 yo  at 35.4 weeks, GBS unknown, CHTN on Labetalol was seen in the office today with mild range Bps 142/80-90s. She denies headache, visual changes, RUQ pain. At home the past couple days Bps have been 136/70s.    Pregnancy c/b:  CHTN: On Labetalol.   Failed 1 hour/passed 3 hour  Pregravid BMI 43.75  Hx sPEC  AMA  Anxiety    Prenatal Provider GWS    OB History    Para Term  AB Living   2 1 0 1 0 1   SAB IAB Ectopic Multiple Live Births   0 0 0 0 1      # Outcome Date GA Lbr Anish/2nd Weight Sex Type Anes PTL Lv   2 Current            1  22 34w0d  2.381 kg M Vag-Spont EPI  JORDY      Name: [id number]       Surgical History[1]    Social History     Tobacco Use    Smoking status: Former     Current packs/day: 0.00     Types: Cigarettes     Quit date: 2022     Years since quitting: 3.2    Smokeless tobacco: Never   Substance Use Topics    Alcohol use: Never       Allergies[2]    Prescriptions Prior to Admission[3]  Objective     Last Vitals  Temp Pulse Resp BP MAP O2 Sat     77   (!) 184/105 140       Blood Pressures         2025  1422 2025  1439          BP: 182/101 184/105               Physical Exam  General:  NAD, mood appropriate  Cardiopulmonary: warm and well perfused, breathing comfortably on room air  Abdomen: Gravid, non-tender  Extremities: Symmetric  Speculum Exam: deferred  Cervix:  cl /50  /-3      Chaperone Present: Yes.  Chaperone Name/Title: Jessica Dos Santos RN  Examination Chaperoned: Gynecological Exam     Fetal Monitoring  Baseline: 120s bpm, Variability: moderate,  Accelerations: present and Decelerations: none  Uterine Activity: No contractions seen on toco  Interpretation: Category 1    Bedside ultrasound: Yes    Labs in chart were reviewed.                   [1]   Past Surgical History:  Procedure Laterality Date    GALLBLADDER SURGERY  04/17/2015    Gallbladder Surgery    MOUTH SURGERY  04/17/2015    Oral Surgery Tooth Extraction    OTHER SURGICAL HISTORY  04/17/2015    Corneal LASIK Bilateral    OTHER SURGICAL HISTORY  10/23/2017    Endovenous Ablation Of Incompetent Vein Radiofrequency   [2]   Allergies  Allergen Reactions    Diazepam Anxiety and Other    Metoclopramide Anxiety    Metoclopramide Hcl Anxiety   [3]   Medications Prior to Admission   Medication Sig Dispense Refill Last Dose/Taking    aspirin 81 mg EC tablet Take 1 tablet (81 mg) by mouth once daily.       labetalol (Normodyne) 200 mg tablet Take 1.5 tablets (300 mg) by mouth 2 times a day. 270 tablet 3     prenatal no115/iron/folic acid (PRENATAL 19 ORAL) Take by mouth.       sertraline (Zoloft) 50 mg tablet Take 2 tablets (100 mg) by mouth once daily. 60 tablet 1

## 2025-07-11 NOTE — PATIENT INSTRUCTIONS
You were seen in the office today for routine OB care and had mildly elevated BP on exam  Continue routine OB precautions at home  Continue taking prenatal vitamins   Avoid sick contacts and consider getting your Flu (available in office during flu season) and COVID vaccines to protect against infection in pregnancy  We recommend getting the Tdap (available in office) and RSV vaccines to pass some immunity from mother to baby and protect your baby against RSV and Whooping cough in the first few months of life. Tdap can be given between 27 and 36 weeks, and RSV vaccinations can be given between 32 and 36 weeks gestation  Make an appointment for routine care in the office in the next 2 weeks  If you are having any painful contractions, vaginal bleeding, leaking amniotic fluid, or decrease in baby's movements prior to your next visit please call the office to speak to the physician on call. This includes after hours, weekends, and holidays, when the answering service will be able to connect you with the physician on call. [phone] ([place] [facility]) or [phone] ([place] [facility]).

## 2025-07-12 ENCOUNTER — ANESTHESIA EVENT (OUTPATIENT)
Dept: OBSTETRICS AND GYNECOLOGY | Facility: HOSPITAL | Age: 40
End: 2025-07-12
Payer: COMMERCIAL

## 2025-07-12 ENCOUNTER — ANESTHESIA (OUTPATIENT)
Dept: OBSTETRICS AND GYNECOLOGY | Facility: HOSPITAL | Age: 40
End: 2025-07-12
Payer: COMMERCIAL

## 2025-07-12 LAB
ABO GROUP (TYPE) IN BLOOD: NORMAL
ALBUMIN SERPL BCP-MCNC: 3.5 G/DL (ref 3.4–5)
ALBUMIN SERPL BCP-MCNC: 3.8 G/DL (ref 3.4–5)
ALP SERPL-CCNC: 117 U/L (ref 33–110)
ALP SERPL-CCNC: 131 U/L (ref 33–110)
ALT SERPL W P-5'-P-CCNC: 13 U/L (ref 7–45)
ALT SERPL W P-5'-P-CCNC: 13 U/L (ref 7–45)
ANION GAP SERPL CALC-SCNC: 16 MMOL/L (ref 10–20)
ANION GAP SERPL CALC-SCNC: 17 MMOL/L (ref 10–20)
ANTIBODY SCREEN: NORMAL
AST SERPL W P-5'-P-CCNC: 20 U/L (ref 9–39)
AST SERPL W P-5'-P-CCNC: 21 U/L (ref 9–39)
BASE EXCESS BLDCOA CALC-SCNC: -7.5 MMOL/L (ref -10.8–-0.5)
BASE EXCESS BLDCOV CALC-SCNC: -7.4 MMOL/L (ref -8.1–-0.5)
BILIRUB SERPL-MCNC: 0.6 MG/DL (ref 0–1.2)
BILIRUB SERPL-MCNC: 0.7 MG/DL (ref 0–1.2)
BODY TEMPERATURE: 37 DEGREES CELSIUS
BODY TEMPERATURE: 37 DEGREES CELSIUS
BUN SERPL-MCNC: 5 MG/DL (ref 6–23)
BUN SERPL-MCNC: 7 MG/DL (ref 6–23)
CALCIUM SERPL-MCNC: 8.2 MG/DL (ref 8.6–10.6)
CALCIUM SERPL-MCNC: 9.2 MG/DL (ref 8.6–10.6)
CHLORIDE SERPL-SCNC: 100 MMOL/L (ref 98–107)
CHLORIDE SERPL-SCNC: 100 MMOL/L (ref 98–107)
CO2 SERPL-SCNC: 20 MMOL/L (ref 21–32)
CO2 SERPL-SCNC: 21 MMOL/L (ref 21–32)
CREAT SERPL-MCNC: 0.5 MG/DL (ref 0.5–1.05)
CREAT SERPL-MCNC: 0.57 MG/DL (ref 0.5–1.05)
EGFRCR SERPLBLD CKD-EPI 2021: >90 ML/MIN/1.73M*2
EGFRCR SERPLBLD CKD-EPI 2021: >90 ML/MIN/1.73M*2
GLUCOSE SERPL-MCNC: 105 MG/DL (ref 74–99)
GLUCOSE SERPL-MCNC: 115 MG/DL (ref 74–99)
HCO3 BLDCOA-SCNC: 22.1 MMOL/L (ref 15–29)
HCO3 BLDCOV-SCNC: 20.5 MMOL/L (ref 16–26)
INHALED O2 CONCENTRATION: 21 %
INHALED O2 CONCENTRATION: 21 %
MAGNESIUM SERPL-MCNC: 3.88 MG/DL (ref 1.6–2.4)
OXYHGB MFR BLDCOA: 34.9 % (ref 94–98)
OXYHGB MFR BLDCOV: 52.7 % (ref 94–98)
PCO2 BLDCOA: 62 MM HG (ref 31–75)
PCO2 BLDCOV: 50 MM HG (ref 22–53)
PH BLDCOA: 7.16 PH (ref 7.08–7.39)
PH BLDCOV: 7.22 PH (ref 7.19–7.47)
PO2 BLDCOA: 26 MM HG (ref 5–31)
PO2 BLDCOV: 29 MM HG (ref 13–37)
POTASSIUM SERPL-SCNC: 3.5 MMOL/L (ref 3.5–5.3)
POTASSIUM SERPL-SCNC: 4.2 MMOL/L (ref 3.5–5.3)
PROT SERPL-MCNC: 6.5 G/DL (ref 6.4–8.2)
PROT SERPL-MCNC: 7.1 G/DL (ref 6.4–8.2)
RH FACTOR (ANTIGEN D): NORMAL
SAO2 % BLDCOA: 35 % (ref 3–69)
SAO2 % BLDCOV: 54 % (ref 16–84)
SODIUM SERPL-SCNC: 132 MMOL/L (ref 136–145)
SODIUM SERPL-SCNC: 134 MMOL/L (ref 136–145)
TREPONEMA PALLIDUM IGG+IGM AB [PRESENCE] IN SERUM OR PLASMA BY IMMUNOASSAY: NONREACTIVE

## 2025-07-12 PROCEDURE — 59400 OBSTETRICAL CARE: CPT

## 2025-07-12 PROCEDURE — 2500000001 HC RX 250 WO HCPCS SELF ADMINISTERED DRUGS (ALT 637 FOR MEDICARE OP)

## 2025-07-12 PROCEDURE — 99199 UNLISTED SPECIAL SVC PX/RPRT: CPT

## 2025-07-12 PROCEDURE — 82805 BLOOD GASES W/O2 SATURATION: CPT

## 2025-07-12 PROCEDURE — 59409 OBSTETRICAL CARE: CPT | Performed by: OBSTETRICS & GYNECOLOGY

## 2025-07-12 PROCEDURE — 2500000004 HC RX 250 GENERAL PHARMACY W/ HCPCS (ALT 636 FOR OP/ED)

## 2025-07-12 PROCEDURE — 01967 NEURAXL LBR ANES VAG DLVR: CPT | Performed by: ANESTHESIOLOGY

## 2025-07-12 PROCEDURE — 82810 BLOOD GASES O2 SAT ONLY: CPT

## 2025-07-12 PROCEDURE — 7100000016 HC LABOR RECOVERY PER HOUR

## 2025-07-12 PROCEDURE — 59409 OBSTETRICAL CARE: CPT

## 2025-07-12 PROCEDURE — 3700000014 EPIDURAL BLOCK: Performed by: ANESTHESIOLOGY

## 2025-07-12 PROCEDURE — 2500000004 HC RX 250 GENERAL PHARMACY W/ HCPCS (ALT 636 FOR OP/ED): Performed by: ANESTHESIOLOGY

## 2025-07-12 PROCEDURE — 2500000002 HC RX 250 W HCPCS SELF ADMINISTERED DRUGS (ALT 637 FOR MEDICARE OP, ALT 636 FOR OP/ED)

## 2025-07-12 PROCEDURE — 83735 ASSAY OF MAGNESIUM: CPT

## 2025-07-12 PROCEDURE — 88307 TISSUE EXAM BY PATHOLOGIST: CPT | Performed by: STUDENT IN AN ORGANIZED HEALTH CARE EDUCATION/TRAINING PROGRAM

## 2025-07-12 PROCEDURE — 0HQ9XZZ REPAIR PERINEUM SKIN, EXTERNAL APPROACH: ICD-10-PCS | Performed by: OBSTETRICS & GYNECOLOGY

## 2025-07-12 PROCEDURE — 0U7C7DJ DILATION OF CERVIX WITH INTRALUM DEV, TEMP, VIA OPENING: ICD-10-PCS | Performed by: OBSTETRICS & GYNECOLOGY

## 2025-07-12 PROCEDURE — 7210000002 HC LABOR PER HOUR

## 2025-07-12 PROCEDURE — 10907ZC DRAINAGE OF AMNIOTIC FLUID, THERAPEUTIC FROM PRODUCTS OF CONCEPTION, VIA NATURAL OR ARTIFICIAL OPENING: ICD-10-PCS | Performed by: OBSTETRICS & GYNECOLOGY

## 2025-07-12 PROCEDURE — 80053 COMPREHEN METABOLIC PANEL: CPT

## 2025-07-12 PROCEDURE — 88307 TISSUE EXAM BY PATHOLOGIST: CPT | Mod: TC,SUR | Performed by: OBSTETRICS & GYNECOLOGY

## 2025-07-12 PROCEDURE — 10H07YZ INSERTION OF OTHER DEVICE INTO PRODUCTS OF CONCEPTION, VIA NATURAL OR ARTIFICIAL OPENING: ICD-10-PCS | Performed by: OBSTETRICS & GYNECOLOGY

## 2025-07-12 PROCEDURE — 1100000001 HC PRIVATE ROOM DAILY

## 2025-07-12 PROCEDURE — 59050 FETAL MONITOR W/REPORT: CPT

## 2025-07-12 PROCEDURE — 3E0P7VZ INTRODUCTION OF HORMONE INTO FEMALE REPRODUCTIVE, VIA NATURAL OR ARTIFICIAL OPENING: ICD-10-PCS | Performed by: OBSTETRICS & GYNECOLOGY

## 2025-07-12 PROCEDURE — 3E0H7GC INTRODUCTION OF OTHER THERAPEUTIC SUBSTANCE INTO LOWER GI, VIA NATURAL OR ARTIFICIAL OPENING: ICD-10-PCS | Performed by: OBSTETRICS & GYNECOLOGY

## 2025-07-12 RX ORDER — OXYTOCIN/0.9 % SODIUM CHLORIDE 30/500 ML
60 PLASTIC BAG, INJECTION (ML) INTRAVENOUS ONCE AS NEEDED
Status: DISCONTINUED | OUTPATIENT
Start: 2025-07-12 | End: 2025-07-15 | Stop reason: HOSPADM

## 2025-07-12 RX ORDER — NIFEDIPINE 30 MG/1
30 TABLET, FILM COATED, EXTENDED RELEASE ORAL EVERY 12 HOURS
Status: DISCONTINUED | OUTPATIENT
Start: 2025-07-12 | End: 2025-07-14

## 2025-07-12 RX ORDER — ADHESIVE BANDAGE
10 BANDAGE TOPICAL
Status: DISCONTINUED | OUTPATIENT
Start: 2025-07-12 | End: 2025-07-15 | Stop reason: HOSPADM

## 2025-07-12 RX ORDER — LOPERAMIDE HYDROCHLORIDE 2 MG/1
4 CAPSULE ORAL EVERY 2 HOUR PRN
Status: DISCONTINUED | OUTPATIENT
Start: 2025-07-12 | End: 2025-07-15 | Stop reason: HOSPADM

## 2025-07-12 RX ORDER — CARBOPROST TROMETHAMINE 250 UG/ML
250 INJECTION, SOLUTION INTRAMUSCULAR ONCE AS NEEDED
Status: DISCONTINUED | OUTPATIENT
Start: 2025-07-12 | End: 2025-07-15 | Stop reason: HOSPADM

## 2025-07-12 RX ORDER — HYDRALAZINE HYDROCHLORIDE 20 MG/ML
5 INJECTION INTRAMUSCULAR; INTRAVENOUS ONCE AS NEEDED
Status: DISCONTINUED | OUTPATIENT
Start: 2025-07-12 | End: 2025-07-15 | Stop reason: HOSPADM

## 2025-07-12 RX ORDER — FAMOTIDINE 10 MG/ML
20 INJECTION, SOLUTION INTRAVENOUS EVERY 12 HOURS SCHEDULED
Status: DISCONTINUED | OUTPATIENT
Start: 2025-07-12 | End: 2025-07-12

## 2025-07-12 RX ORDER — ENOXAPARIN SODIUM 100 MG/ML
60 INJECTION SUBCUTANEOUS EVERY 24 HOURS
Status: DISCONTINUED | OUTPATIENT
Start: 2025-07-13 | End: 2025-07-15 | Stop reason: HOSPADM

## 2025-07-12 RX ORDER — DIPHENHYDRAMINE HCL 25 MG
25 CAPSULE ORAL EVERY 6 HOURS PRN
Status: DISCONTINUED | OUTPATIENT
Start: 2025-07-12 | End: 2025-07-15 | Stop reason: HOSPADM

## 2025-07-12 RX ORDER — TRANEXAMIC ACID 1 G/10ML
1000 INJECTION, SOLUTION INTRAVENOUS ONCE AS NEEDED
Status: DISCONTINUED | OUTPATIENT
Start: 2025-07-12 | End: 2025-07-15 | Stop reason: HOSPADM

## 2025-07-12 RX ORDER — LABETALOL 200 MG/1
400 TABLET, FILM COATED ORAL 2 TIMES DAILY
Status: DISCONTINUED | OUTPATIENT
Start: 2025-07-12 | End: 2025-07-12

## 2025-07-12 RX ORDER — LABETALOL 100 MG/1
100 TABLET, FILM COATED ORAL ONCE
Status: COMPLETED | OUTPATIENT
Start: 2025-07-12 | End: 2025-07-12

## 2025-07-12 RX ORDER — LABETALOL HYDROCHLORIDE 5 MG/ML
20 INJECTION, SOLUTION INTRAVENOUS ONCE AS NEEDED
Status: DISCONTINUED | OUTPATIENT
Start: 2025-07-12 | End: 2025-07-15 | Stop reason: HOSPADM

## 2025-07-12 RX ORDER — ACETAMINOPHEN 325 MG/1
975 TABLET ORAL EVERY 6 HOURS
Status: DISCONTINUED | OUTPATIENT
Start: 2025-07-12 | End: 2025-07-15 | Stop reason: HOSPADM

## 2025-07-12 RX ORDER — ONDANSETRON 4 MG/1
4 TABLET, FILM COATED ORAL EVERY 6 HOURS PRN
Status: DISCONTINUED | OUTPATIENT
Start: 2025-07-12 | End: 2025-07-15 | Stop reason: HOSPADM

## 2025-07-12 RX ORDER — METHYLERGONOVINE MALEATE 0.2 MG/ML
0.2 INJECTION INTRAVENOUS ONCE AS NEEDED
Status: DISCONTINUED | OUTPATIENT
Start: 2025-07-12 | End: 2025-07-15 | Stop reason: HOSPADM

## 2025-07-12 RX ORDER — ONDANSETRON HYDROCHLORIDE 2 MG/ML
4 INJECTION, SOLUTION INTRAVENOUS EVERY 6 HOURS PRN
Status: DISCONTINUED | OUTPATIENT
Start: 2025-07-12 | End: 2025-07-15 | Stop reason: HOSPADM

## 2025-07-12 RX ORDER — IBUPROFEN 600 MG/1
600 TABLET, FILM COATED ORAL EVERY 6 HOURS
Status: DISCONTINUED | OUTPATIENT
Start: 2025-07-12 | End: 2025-07-15 | Stop reason: HOSPADM

## 2025-07-12 RX ORDER — LABETALOL 200 MG/1
600 TABLET, FILM COATED ORAL 2 TIMES DAILY
Status: DISCONTINUED | OUTPATIENT
Start: 2025-07-12 | End: 2025-07-15

## 2025-07-12 RX ORDER — POLYETHYLENE GLYCOL 3350 17 G/17G
17 POWDER, FOR SOLUTION ORAL 2 TIMES DAILY PRN
Status: DISCONTINUED | OUTPATIENT
Start: 2025-07-12 | End: 2025-07-15 | Stop reason: HOSPADM

## 2025-07-12 RX ORDER — SIMETHICONE 80 MG
80 TABLET,CHEWABLE ORAL 4 TIMES DAILY PRN
Status: DISCONTINUED | OUTPATIENT
Start: 2025-07-12 | End: 2025-07-15 | Stop reason: HOSPADM

## 2025-07-12 RX ORDER — MISOPROSTOL 200 UG/1
800 TABLET ORAL ONCE AS NEEDED
Status: DISCONTINUED | OUTPATIENT
Start: 2025-07-12 | End: 2025-07-15 | Stop reason: HOSPADM

## 2025-07-12 RX ORDER — DIPHENHYDRAMINE HYDROCHLORIDE 50 MG/ML
25 INJECTION, SOLUTION INTRAMUSCULAR; INTRAVENOUS ONCE
Status: DISCONTINUED | OUTPATIENT
Start: 2025-07-12 | End: 2025-07-12

## 2025-07-12 RX ORDER — OXYTOCIN 10 [USP'U]/ML
10 INJECTION, SOLUTION INTRAMUSCULAR; INTRAVENOUS ONCE AS NEEDED
Status: DISCONTINUED | OUTPATIENT
Start: 2025-07-12 | End: 2025-07-15 | Stop reason: HOSPADM

## 2025-07-12 RX ORDER — DIPHENHYDRAMINE HYDROCHLORIDE 50 MG/ML
25 INJECTION, SOLUTION INTRAMUSCULAR; INTRAVENOUS EVERY 6 HOURS PRN
Status: DISCONTINUED | OUTPATIENT
Start: 2025-07-12 | End: 2025-07-15 | Stop reason: HOSPADM

## 2025-07-12 RX ORDER — FENTANYL/ROPIVACAINE/NS/PF 2MCG/ML-.2
0-25 PLASTIC BAG, INJECTION (ML) INJECTION CONTINUOUS
Refills: 0 | Status: DISCONTINUED | OUTPATIENT
Start: 2025-07-12 | End: 2025-07-12

## 2025-07-12 RX ADMIN — Medication 60 MILLI-UNITS/MIN: at 12:25

## 2025-07-12 RX ADMIN — MAGNESIUM SULFATE HEPTAHYDRATE 2 G/HR: 40 INJECTION, SOLUTION INTRAVENOUS at 21:34

## 2025-07-12 RX ADMIN — FAMOTIDINE 20 MG: 10 INJECTION INTRAVENOUS at 03:43

## 2025-07-12 RX ADMIN — LABETALOL HYDROCHLORIDE 20 MG: 5 INJECTION, SOLUTION INTRAVENOUS at 16:02

## 2025-07-12 RX ADMIN — MAGNESIUM SULFATE HEPTAHYDRATE 2 G/HR: 40 INJECTION, SOLUTION INTRAVENOUS at 10:45

## 2025-07-12 RX ADMIN — PENICILLIN G 3 MILLION UNITS: 3000000 INJECTION, SOLUTION INTRAVENOUS at 07:40

## 2025-07-12 RX ADMIN — LABETALOL HYDROCHLORIDE 100 MG: 100 TABLET, FILM COATED ORAL at 05:39

## 2025-07-12 RX ADMIN — PENICILLIN G 3 MILLION UNITS: 3000000 INJECTION, SOLUTION INTRAVENOUS at 03:41

## 2025-07-12 RX ADMIN — NIFEDIPINE 30 MG: 30 TABLET, FILM COATED, EXTENDED RELEASE ORAL at 18:31

## 2025-07-12 RX ADMIN — PENICILLIN G 3 MILLION UNITS: 3000000 INJECTION, SOLUTION INTRAVENOUS at 11:47

## 2025-07-12 RX ADMIN — LABETALOL HYDROCHLORIDE 400 MG: 200 TABLET, FILM COATED ORAL at 09:36

## 2025-07-12 RX ADMIN — SERTRALINE HYDROCHLORIDE 100 MG: 100 TABLET ORAL at 09:36

## 2025-07-12 RX ADMIN — LABETALOL HYDROCHLORIDE 600 MG: 200 TABLET, FILM COATED ORAL at 21:31

## 2025-07-12 RX ADMIN — NIFEDIPINE 30 MG: 30 TABLET, FILM COATED, EXTENDED RELEASE ORAL at 05:39

## 2025-07-12 RX ADMIN — MISOPROSTOL 800 MCG: 200 TABLET ORAL at 12:49

## 2025-07-12 RX ADMIN — Medication 2 MILLI-UNITS/MIN: at 00:07

## 2025-07-12 RX ADMIN — IBUPROFEN 600 MG: 600 TABLET ORAL at 16:50

## 2025-07-12 RX ADMIN — IBUPROFEN 600 MG: 600 TABLET ORAL at 23:32

## 2025-07-12 RX ADMIN — Medication 5 ML: at 04:51

## 2025-07-12 RX ADMIN — ACETAMINOPHEN 975 MG: 325 TABLET ORAL at 23:32

## 2025-07-12 RX ADMIN — Medication 10 ML/HR: at 04:52

## 2025-07-12 RX ADMIN — ACETAMINOPHEN 975 MG: 325 TABLET ORAL at 16:50

## 2025-07-12 ASSESSMENT — PAIN SCALES - GENERAL
PAINLEVEL_OUTOF10: 0 - NO PAIN
PAINLEVEL_OUTOF10: 1
PAINLEVEL_OUTOF10: 0 - NO PAIN
PAINLEVEL_OUTOF10: 2
PAINLEVEL_OUTOF10: 0 - NO PAIN
PAINLEVEL_OUTOF10: 0 - NO PAIN

## 2025-07-12 NOTE — L&D DELIVERY NOTE
Vaginal Delivery Note    Patient Name: Sarahy Rehman  : 1985  MRN: [id number]  Age: 39 y.o.    /Para:   Gestational Age: 35w5d    Date of Delivery: 2025    Procedure: Normal Spontaneous Vaginal Delivery    Delivery Provider: Judith Ahuja MD    Resident/Fellow/Other Assistant: Sandra Sun MD, [place]    Description of Procedure:  Delivery of viable infant under epidural anesthesia. Delayed clamping was performed. The infant was placed skin to skin. Cord gases were sent.  Cord blood was collected. Placenta delivered intact and fundus was firm following uterotonics.    1 perineal abrasion identified and repaired.    Additional Procedures:  None    Findings:   Amniotic fluid Clear;Bloody;Pink, Male infant in Vertex Occiput Anterior presentation, APGARS 7 , 7 .  Birth Weight  .    Complications: None    Quantitative Blood Loss:   Delivery QBL: 0 mL (2025 12:20 PM - 2025  3:24 PM)    Blood products:      Uterotonics/Hemostatic Agent: IV Pitocin 30 units and FL Misoprostol 800 mcg    Specimen:   Placenta  Delivered: 2025 12:24 PM  Appearance: Intact  Removal: Spontaneous    Disposition: pathology    Sponge/Instrument/Needle Counts: The sponge, lap and needle counts were correct.    Patient Disposition: Patient recovering on labor and delivery in stable condition.    Dang Rehman [[id number]]      Labor Events    Rupture date/time: 2025 0555  Fluid color: Clear, Bloody, Pink  Fluid odor: None  Labor type: Induced Onset of Labor  Labor allowed to proceed with plans for an attempted vaginal birth?: Yes  Complications: None       Placenta    Placenta delivery date/time: 2025 12:24  Placenta removal: Spontaneous  Placenta appearance: Intact  Placenta disposition: pathology       Cord    Vessels: 3 vessels  Complications: Nuchal  Nuchal intervention: reduced  Nuchal cord description: loose nuchal cord  Delayed cord clamping?: Yes  Cord blood disposition: Lab  Gases  sent?: Yes  Stem cell collection (by provider): No       Lacerations    Episiotomy: None  Perineal laceration: None  Perineal laceration repaired?: Yes  Repair suture: 2-0 Synthetic Suture       Anesthesia    Method: Epidural       Operative Delivery    Forceps attempted?: No  Vacuum extractor attempted?: No       Shoulder Dystocia    Shoulder dystocia present?: No        Delivery    Birth date/time: 2025 12:20:00  Delivery type: Vaginal, Spontaneous  Complications: None       Resuscitation    Method: Tactile stimulation, Continuous positive airway pressure (CPAP), Suctioning       Apgars    Living status: Living  Apgar Component Scores:  1 min.:  5 min.:  10 min.:  15 min.:  20 min.:    Skin color:  1  1       Heart rate:  2  2       Reflex irritability:  2  2       Muscle tone:  1  1       Respiratory effort:  1  1       Total:  7  7       Apgars assigned by: BENOIT       Delivery Providers    Delivering clinician: Judith Ahuja MD   Provider Role    Monique Mason RN Delivery Nurse    Amor Bush RN Nursery Nurse    Sandra Sun MD Resident

## 2025-07-12 NOTE — SIGNIFICANT EVENT
CRB #2    Pt amenable to CRB re-placement.    SVE 1/30/-3  CRB placed via bimanual exam through the external os  Cervical balloon inflated to 60cc with saline  Placement confirmed with traction  Pt tolerated well    A/P  - CRB in place, continue pit  - Cat I tracing    D/w Dr. Ayleen Branch MD PGY-4

## 2025-07-12 NOTE — SIGNIFICANT EVENT
0055 Update    RN notified of pt feeling sweaty during PCN infusion. To bedside. Patient endorsing feeling hot. Denies CP, SOB, throat swelling or any other symptoms. Patient has had PCN before as a child and has had no reaction to it.     /77   Pulse 83   LMP  (LMP Unknown)   SpO2 98%   Constitutional: No visible distress, alert and cooperative  Respiratory/Thorax: Normal respiratory effort on RA, CTAB  Cardiovascular: Reg rate and rhythm  Gastrointestinal: gravid  Neurological: A&Ox3  Psychological: Appropriate mood and behavior      PCN infusion  - afebrile, VSS on RA with reassuring exam.   -D/w pt will continue to monitor but low concern for allergic reaction to PCN. -Can administer benadryl and okay to continue PCN infusion.    Yesica Hatch MD  PGY-2, Obstetrics and Gynecology

## 2025-07-12 NOTE — ANESTHESIA PREPROCEDURE EVALUATION
Patient: Sarahy Rehman    Evaluation Method: In-person visit    Procedure Information    Date: 25  Procedure: Labor Consult         Relevant Problems   Anesthesia (within normal limits)      Cardiac   (+) Chronic hypertension in pregnancy ([facility])   (+) Chronic hypertension with superimposed pre-eclampsia ([facility])      Pulmonary (within normal limits)      Neuro   (+) Anxiety      GI   (+) Gastroesophageal reflux disease (Pregnancy related, well controlled with TUMS)      /Renal (within normal limits)      Liver (within normal limits)      Endocrine   (+) Obesity in pregnancy ([facility])      Hematology (within normal limits)      Musculoskeletal (within normal limits)      GYN   (+) 35 weeks gestation of pregnancy ([facility])   (+) 36 weeks gestation of pregnancy ([facility])   (+) Antepartum multigravida of advanced maternal age ([facility])       Clinical information reviewed:    Allergies  Meds               NPO Detail:  No data recorded     OB/Gyn Evaluation    Present Pregnancy    Patient is pregnant now.  (+) , hypertensive disorder of pregnancy - superimposed preeclampsia with severe features   Obstetric History                Physical Exam    Airway  Mallampati: IV  TM distance: >3 FB  Neck ROM: full  Mouth openin finger widths     Cardiovascular    Dental    Pulmonary    Abdominal            Anesthesia Plan    History of general anesthesia?: yes  History of complications of general anesthesia?: no    ASA 3   (GA and epidural R/B discussed.  Questions welcomed.  Pt agrees with plan.)  Anesthetic plan and risks discussed with patient.  Use of blood products discussed with patient who consented to blood products.    Plan discussed with CAA and attending.

## 2025-07-12 NOTE — HOSPITAL COURSE
HOT PREP: Please do not transfer to handoff until all auto-populated fields are complete  -----------------------------------------------------  SUMMARY SECTION:    Sarahy Rehman is a Gestational Age: <None> {baby size:16960} female born 1985 at  via  to a This patient's mother is not on file. This patient's mother is not on file. mother, with blood type O+ Ab *** and PNS notable for GBS ***. bw No birth weight on file., with active issues of *** .      complications: {perinatalcomps:39863}    Delivery history:  Code Pink Level *** for ***  Apgars   at 1min,  at 5min  Resuscitation: This patient has no babies on file.  Rupture of Membranes Duration: This patient's mother is not on file.  Fluid: ***    Pregnancy history:  Abnormal Labs: Abnormal 1 hr GTT, repeat 1/2/3 hr normal. GBS pending ***  Ultrasounds: Normal   Pregnancy complications/maternal PMH:  AMA, obesity, anxiety, chronic HTN with superimposed Preeclampsia with severe range BP's. History of  delivery due to PEC.  Maternal meds: aspirin, labetalol, PNV, sertraline      Measurements/[place] percentiles:  Birth Weight: No birth weight on file. (Facility age limit for growth %shahla is 20 years.)  Length: This patient has no babies on file. (Facility age limit for growth %shahla is 20 years.)  Head circumference: This patient has no babies on file. (Facility age limit for growth %shahla is 20 years.)    __________________________________________________________________________    COVERAGE TO DO:    Sarahy Rehman is a Gestational Age: <None> {baby size:04326} female bw No birth weight on file.  on 1985 at      ACTIVE ISSUES:   ***    FEEDING PLAN: {Plan; breastfeedin}    BILI  Neurotoxicity risk factors present?  {YES-DESCRIBE/NO:65905}  - Mom blood type: O+ Ab ***  - Baby's blood type: *** , G6PD: ***  Q12H TcB:  *** @ *** HOL, LL ***  *** @ *** HOL, LL ***    SEPSIS  Sepsis Risk score: Sepsis Risk Factors: *** (if high  risk)  Overall  ***;   Well ***;   Equivocal *** ;  Ill: ***.  Action points:***    HYPOGLYCEMIA  At-Risk for Hypoglycemia?: {YES-DESCRIBE/NO:92367}    TO DO:  [ ] ***  ------------------------------------------------------------------------------  DISCHARGE PLANNING:    Anticipated Discharge: ***  Screening/Prevention  [***] Admission Syphilis screen: {NEG/POS/NT:11723}  [***] Vitamin K: {Yes, No:81913}  [***] Erythromycin: {Yes, No:41028}  [***] HEP B Vaccine consent: {Yes/No/Refuse:59778}; Date received: ***  [***] NBS Done: {YES/DATE/NO:10226}  [***] Hearing Screen: {Nbn daija hearing screen pass / fail:92720}  [***] Congenital Heart Screen: {pass/fail:72274:::1}  [***] Car seat: {Pass/Not Pass:06967}  [***] Circumcision consent: {DONE/NOT DONE:95025}; Ordered {Yes, No:01573}  [***] Maternal RSV  [***] Follow-up: Physician: Kevin Brown   [***] Appointment date & time: ***  Other Problems:  [***] ***  ------------------------------------------------------------------------------------------  Helpful INFO:    Mother's Information  Prenatal labs:   This patient's mother is not on file.  Toxicology:   This patient's mother is not on file.  Labs:  This patient's mother is not on file.  Fetal Imaging:  This patient's mother is not on file.    Maternal History and Problem List:   This patient's mother is not on file.  Maternal Home Medications:   This patient's mother is not on file.  Social History: This patient's mother is not on file.This patient's mother is not on file.

## 2025-07-12 NOTE — PROGRESS NOTES
Postpartum Progress Note    Assessment/Plan   Sarahy Rehman is a 39 y.o., , who delivered at 35w5d gestation and is now postpartum day 0.    Now PPD#1 s/p  on   - continue routine postpartum care  - pain well controlled on po medications  - DVT Score (IF A SCORE IS NOT CALCULATING, MUST SELECT A BMI TO COMPLETE): 5, for ppx lovenox  - Hgb:   Results from last 7 days   Lab Units 25  2248 25  1456   HEMOGLOBIN g/dL 13.1 12.2       siPEC w/SF   Diagnosed by severe range Bps requiring IV treatment at OSH. Patient with known cHTN on labetalol 300 mg BID at home. She was started on nifed 30 at OSH  - Normotensive with couple mild ranges   - BP regimen: Nifed 30 BID, labetalol 400 BID > labetalol 600 BID + nifed 30 BID   -  HELLP labs neg x2, P:C 0.16,  (possibly hemolyzed), uric acid wnl  - asymptomatic   - Continue to monitor BP   - adequate UOP    Maternal Well-Being  - emotional support provided, discussed resources including OBGYN behavioral health     Deeth Feeding  - breastfeeding/pumping encouraged; lactation consult prn    Contraception  - partner vasectomy     Dispo  - anticipate d/c on PPD #3 if meeting all postpartum milestones and for BP control  - for f/u 4-6 weeks with Primary OB provider     Seen and d/w Dr. Prior Yesica Hatch MD  PGY-2, Obstetrics and Gynecology       Subjective   Her pain is well controlled with current medications  She is not ambulating well on Mg  She is tolerating a Adult diet Regular  She reports no breast or nursing problems  She reports emotional concerns about her baby today   Her plan for contraception is vasectomy     Patient is still emotional about her baby going to the NICU. Otherwise denies HA, vision changes, SOB, CP, or RUQ pain.       Objective     Last Vitals:  Temp Pulse Resp BP MAP Pulse Ox   36.9 °C (98.4 °F) 83 16 (!) 146/79 107 (!) 92 %     Vitals Min/Max Last 24 Hours:  Temp  Min: 35.7 °C (96.3 °F)  Max: 36.9 °C (98.4 °F)  Pulse   Min: 66  Max: 101  Resp  Min: 16  Max: 20  BP  Min: 108/59  Max: 185/101  MAP (mmHg)  Min: 74  Max: 136    Intake/Output:     Intake/Output Summary (Last 24 hours) at 7/12/2025 1841  Last data filed at 7/12/2025 1743  Gross per 24 hour   Intake 2775.59 ml   Output 1870 ml   Net 905.59 ml       Physical Exam:  Constitutional: No visible distress, alert and cooperative  Respiratory/Thorax: Normal respiratory effort on RA. Lungs CTAB.  Cardiovascular: RRR  Gastrointestinal: soft, nondistended, nontender  Neurological: 2+ DTR in bilat UE  Psychological: Appropriate mood and behavior      Lab Data:  Lab Results   Component Value Date    WBC 14.7 (H) 07/11/2025    HGB 13.1 07/11/2025    HCT 40.6 07/11/2025     07/11/2025     Lab Results   Component Value Date    GLUCOSE 105 (H) 07/12/2025     (L) 07/12/2025    K 3.5 07/12/2025     07/12/2025    CO2 21 07/12/2025    ANIONGAP 17 07/12/2025    BUN 7 07/12/2025    CREATININE 0.57 07/12/2025    EGFR >90 07/12/2025    CALCIUM 8.2 (L) 07/12/2025    ALBUMIN 3.5 07/12/2025    PROT 6.5 07/12/2025    ALKPHOS 117 (H) 07/12/2025    ALT 13 07/12/2025    AST 21 07/12/2025    BILITOT 0.6 07/12/2025     0   Lab Value Date/Time    UTPCR 0.16 07/11/2025 1456    UTPCR 237 (H) 06/26/2025 1316    UTPCR 0.237 (H) 06/26/2025 1316    UTPCR 0.07 12/31/2024 1135    UTPCR 0.22 (H) 11/22/2022 1552    UTPCR SEE COMMENT 07/28/2022 0709

## 2025-07-12 NOTE — SIGNIFICANT EVENT
Labor progress note  SUBJECTIVE  Patient doing well     OBJECTIVE  BP (!) 142/86   Pulse 87   LMP  (LMP Unknown)   SpO2 98%       Cervical Exam  Dilation: Closed  Effacement (%): 50  Fetal Station: -3  OB Examiner: Lata MOSQUEDA           A&P    IOL  - CRB placed with speculum and inflated to 60 ml and pt tolerated procedure well with pre-treatment with IV fentanyl 25 mcg.   - Continue to monitor for cervical change  - CEFM, cat I currently  - GBS: unknown, on PCN  - epidural: at pt request  - delivery: desires vaginal    Pt d/w Dr. Ayleen Hatch MD  PGY-1, Obstetrics and Gynecology

## 2025-07-12 NOTE — ANESTHESIA PROCEDURE NOTES
Epidural Block    Patient location during procedure: OB  Start time: 7/12/2025 4:41 AM  End time: 7/12/2025 4:48 AM  Reason for block: labor analgesia  Staffing  Performed: EFRAÍN   Authorized by: Jessica Collazo MD    Performed by: EFRAÍN Chery    Preanesthetic Checklist  Completed: patient identified, IV checked, risks and benefits discussed, surgical consent, monitors and equipment checked, pre-op evaluation, timeout performed and sterile techniques followed  Block Timeout  RN/Licensed healthcare professional reads aloud to the Anesthesia provider and entire team: Patient identity, procedure with side and site, patient position, and as applicable the availability of implants/special equipment/special requirements.  Patient on coagulant treatment: no  Timeout performed at: 7/12/2025 4:41 AM  Block Placement  Patient position: sitting  Prep: ChloraPrep  Sterility prep: cap, drape, gloves and mask  Sedation level: no sedation  Patient monitoring: blood pressure, continuous pulse oximetry and heart rate  Approach: midline  Local numbing: lidocaine 1% to skin and subcutaneous tissues  Vertebral space: lumbar  Lumbar location: L3-L4  Epidural  Loss of resistance technique: saline  Guidance: landmark technique        Needle  Needle type: Tuohy   Needle gauge: 17  Needle length: 8.9cm  Needle insertion depth: 6.5 cm  Catheter type: multi-orifice  Catheter size: 19 G  Catheter at skin depth: 11.5 cm  Catheter securement method: clear occlusive dressing and liquid medical adhesive    Test dose: lidocaine 1.5% with epinephrine 1-to-200,000  Test dose: lidocaine 1.5% with epinephrine 1-to-200,000  Test dose result: no positive test dose    PCEA  Medication concentration used: 0.2% Ropivacaine with 2 mcg/mL Fentanyl  Dose (mL): 5  Lockout (minutes): 30  1-Hour Limit (boluses/hr): 2  Basal Rate: 10        Assessment  Block outcome: patient comfortable  Number of attempts: 1  Events: no positive test dose  Procedure  assessment: patient tolerated procedure well with no immediate complications  Additional Notes  1 attempt, easy placement

## 2025-07-12 NOTE — SIGNIFICANT EVENT
Labor progress note  SUBJECTIVE  Patient doing well. RN notified of CRB out.      OBJECTIVE  /77   Pulse 89   LMP  (LMP Unknown)   SpO2 98%       Cervical Exam  Dilation: Closed  Effacement (%): 50  Fetal Station: -3  OB Examiner: Lata MOSQUEDA  Fetal Assessment  Movement: Present  Mode: External US  Baseline Fetal Heart Rate (bpm): 135 bpm  Baseline Classification: Normal  Variability: Moderate (Between 6 and 25 BPM)  Pattern: Accelerations  Multiple Births: No      Contraction Frequency: None    A&P    IOL  - SVE closed/50/-3  - Discussed with patient that the CRB was not in the correct place. Offered to replace the CRB. Patient would like to defer for now and see if the pitocin will help create change. That is reasonable and will recheck in 2 hours.   - Continue to monitor for cervical change  - continue pitocin per protocol  - CEFM, cat I currently  - GBS: unknown, PCN  - epidural: at pt request  - delivery: desires vaginal    Yesica Hatch MD  PGY-1, Obstetrics and Gynecology

## 2025-07-12 NOTE — H&P
OB Admission H&P    Assessment/Plan    Sraahy Rehman is a 40 yo  at 35.4 wga by 6 wk US presenting as transfer from OSH for siPEC w/SF and admitted for IOL     Plan  siPEC w/SF  Diagnosed by severe range Bps requiring IV treatment at OSH. Patient with known cHTN on labetalol 300 mg BID at home. She was started on nifed 30 at OSH.   - plan for IOL given siPEC w/SF  - IV BP regimen: s/p labetalol // @1449, hydra 10/10 @1551  - BP regimen: lab 300 BID + nifed 30 ()   - HELLP labs neg x1, 2nd set pending, P:C 0.16,  (possibly hemolyzed), uric acid wnl  - s/p BMZ #1 at OSH, for 2nd dose in 24 hours if still pregnant  - SPENCER > will treat with tylenol/bendryl (pt gets anxious with reglan) and fu response  - Mg gtt, no signs/symptoms of Mg toxicity    IOL  -Admit to L&D, consented  -T&S, syphilis (collected at OSH)   -Epidural at patient request  - SVE closed/50/-3  -Recheck as clinically indicated by maternal or fetal status  -Plan to initiate induction with CRB and pitocin    Anxiety:  - Zoloft continued     Fetal Status  -NST reactive, reassuring   -Presentation cephalic based on ultrasound  -Last US,  EFW 2096g 58%, AC 85% > ~2800g extrapolated   -GBS pending , for PCN given unknown status    Postpartum  Contraception Plan: partner vasectomy     Seen and d/w Dr. Ayleen Hatch MD  PGY-2, Obstetrics and Gynecology       Pregnancy Problems (from 24 to present)       Problem Noted Diagnosed Resolved    Chronic hypertension with superimposed pre-eclampsia ([facility]) 2025 by Bonny Osborne MD  No    Priority:  Medium       Glucose tolerance test abnormal 2025 by Rosey Whitley DO  No    Priority:  Medium       Overview Signed 2025  4:08 PM by Rosey Whitley DO   1 hour   [x]  3 hour GTT PASSED         History of  delivery, currently pregnant ([facility]) 1/15/2025 by Ivonne Merlos MD  No    Priority:  Medium       Overview Signed 1/15/2025  10:01 AM by Ivonne Merlos MD   34 week delivery in G1  IOL for CHTN with superimposed PEC         Antepartum multigravida of advanced maternal age ([facility]) 2025 by Lisa Ravi, RN  No    Priority:  Medium       Overview Addendum 2025  8:52 AM by Dilia Skelton APRN-CNP   [x]  Aneuploidy screening offered - Prequel NIPT ordered 1/15/25           History of pre-eclampsia 2025 by Lisa Ravi, RN  No    Priority:  Medium       Overview Addendum 1/15/2025  9:59 AM by Ivonne Merlos MD   Superimposed sPEC in G1.   Delivery at 34 weeks.  Baseline PIH labs - normal; UPC 0.07         36 weeks gestation of pregnancy ([facility]) 2025 by Mike Payton MA  No    Priority:  Medium       Overview Addendum 2025  4:29 PM by Ivonne Merlos MD   38 yo     1st Trimester  [x]  OB profile/lab work 24 NL, BLOOD TYPE IS O+  [x]  Pap 1-15-25  NEG HPV NEG  [x]  GC DNA probe 1-15-25 NEG/NEG  [x]  Urine culture 1-15-25 NEG  [x]  Early A1c (BMI 30+) 24 5.4/108  [x]  Aneuploidy screening (Prequel 10+ wk/First Trimester Check 11-14 wk) 1/15/25 PREQUEL NEG  [x]  Carrier screening 1/15/25 FORESIGHT NEG  [x]  First trimester anatomy US/NT - 25: 13w2d, CRL c/w SHA, normal organ survey, NT subjectively normal.     2nd Trimester  [x]  Anatomy US (19-21 wks) 3/27/25: 20w2d, biometry c/w GA, posterior placenta w/o previa, no malformations but views of spine, aortic arch, SVC/IVC incomplete. Follow up in 2 weeks.  Ultrasound 25 at 23w3d. Normal growth. Anatomy views completed. Follow up at 28 weeks.  Ultrasound 25 at 29w1d. Normal growth, 66%. BPP . Follow up at 32 weeks.  Ultrasound 25 at 32w3d. Normal growth, EFW 58%. BPP . Follow up in 4 weeks.  [x]  1 hour Glucose (25-28 wks) 25 148 - FAILED  [x]  CBC (25-28 wks) ORDERED 25 12.6/36.6  [x]  3 hour GTT (if needed) 25 95/145/104/74 - PASSED    3rd Trimester  []  GBS (36-37 wks)  []  L&D  consent  []  TOLAC consent (if needed)    Vaccines  [x]  COVID 2021 x3, 2022 x1  [x]  Flu (September to May) 2024  [x]  Tdap (27-36 wks) 25  []  RSV (32-36 wks Sept-)             Obesity in pregnancy ([facility]) 2024 by Destiny Chandra CMA  No    Priority:  Medium       Overview Addendum 2025  8:52 AM by FATOUMATA Montes De Oca   [x]  BMI at ENOB 43.62  [x]  Baseline A1c 5.4/108  [x]  NST: Started at 32 weeks for CHTN/meds  [x]  Growth US 30 and 36 wks  If BMI 50+ at any point in pregnancy, delivery at [facility]  Delivery recommended 39w0d to 39w6d             Anxiety 2023 by Aguilar Rincon,   No    Priority:  Medium       Overview Signed 3/14/2025  4:32 PM by Rosey Whitley, DO   Stable on Zoloft, monitor closely for acute worsening         Chronic hypertension in pregnancy ([facility]) 10/13/2023 by BRADEN Pringle-CNP  No    Priority:  Medium       Overview Addendum 2025  8:51 AM by FATOUMATA Montes De Oca   Meds: Labetalol  [x]  Baseline HELLP labs negative  [x]  Baseline UPCr 0.07  [x]   mg/day starting at 12 weeks GA Taking as advised  [x]  MFM consult (if complicated/appropriate) Referral placed   [x]  US for growth (/30/36 wks if meds)  [x]  NST/BPP weekly starting at 32 weeks (meds)    Delivery recommended: [id number] no meds, [id number] meds                   Subjective   40 yo  at 35.4 wga by 6 wk US presenting as transfer from OSH for siPEC w/SF    Patient was at outpatient appointment and had mild-range BP and told to go to triage at OSH. At that hospital, she had severe-range Bps requiring treatment with labetalol 20/40/80 and hydra 10/10. She was started on nifed 30 and given BMZ and Mg. Patient currently has a moderated headache but denies vision changes, SOB, CP or RUQ pain. No contractions, feeling some rectal pressure but no VB or LOF. Feeling good FM. Has a partner vasectomy and is wanting an epidural.     Pregnancy  notables:  Abnormal 1 hour, normal 3 hr  Hx of sPEC  AMA - rrcfdna   Anxiety on zoloft   Chronic HTN - labetalol 300 BID baseline HELLP labs wnl, P:C 0.237   BMI 46  Hx of PTD @34 wks iso siPEC  Hx of CCY     Prenatal Provider [place]    OB History    Para Term  AB Living   2 1 0 1 0 1   SAB IAB Ectopic Multiple Live Births   0 0 0 0 1      # Outcome Date GA Lbr Anish/2nd Weight Sex Type Anes PTL Lv   2 Current            1  22 34w0d  2.381 kg M Vag-Spont EPI  JORDY      Name: [email or web address]       Surgical History[1]  CCY    Social History     Tobacco Use    Smoking status: Former     Current packs/day: 0.00     Types: Cigarettes     Quit date: 2022     Years since quitting: 3.2    Smokeless tobacco: Never   Substance Use Topics    Alcohol use: Never       Allergies[2]    Prescriptions Prior to Admission[3]  Objective     Last Vitals  Temp Pulse Resp BP MAP O2 Sat     88   (!) 185/101 136 97 %     Blood Pressures         2025  2137 2025  2236          BP: 159/88 185/101               Physical Exam  General: NAD, mood appropriate  Cardiopulmonary: warm and well perfused, breathing comfortably on room air, CTAB, RRR  Abdomen: Gravid, non-tender  Neuro: +2 DTR bilaterally in UE  Extremities: Symmetric  Speculum Exam: deferred  Cervix: Closed /50 /-3   Vertex by BSUS    Chaperone Present: Yes.  Chaperone Name/Title: Destiny Pace   Examination Chaperoned: Gynecological Exam     Fetal Monitoring  Baseline: 155/mod/+accel/-decel    Uterine Activity: No contractions seen on toco  Interpretation: Category I    Bedside ultrasound: Yes    Labs in chart were reviewed.  CBC   Recent Labs     25  1456   WBC 11.6*   HGB 12.2   HCT 36.6        CMP   Recent Labs     25  1456   *   K 4.2      CO2 21   ANIONGAP 13   BUN 7   CREATININE 0.59   EGFR >90   CALCIUM 8.6   ALBUMIN 3.4   PROT 6.8   ALKPHOS 102   ALT 13   AST 28   BILITOT 0.5     PCR   Recent Labs      07/11/25  1456   TPUC 7   CREATU 43.7   UTPCR 0.16      Results from last 7 days   Lab Units 07/11/25  1456   WBC AUTO x10*3/uL 11.6*   HEMOGLOBIN g/dL 12.2   HEMATOCRIT % 36.6   PLATELETS AUTO x10*3/uL 252   AST U/L 28   ALT U/L 13   CREATININE mg/dL 0.59        Prenatal labs reviewed, remarkable for abnormal 1 hr, normal 3 hr .             [1]   Past Surgical History:  Procedure Laterality Date    GALLBLADDER SURGERY  04/17/2015    Gallbladder Surgery    MOUTH SURGERY  04/17/2015    Oral Surgery Tooth Extraction    OTHER SURGICAL HISTORY  04/17/2015    Corneal LASIK Bilateral    OTHER SURGICAL HISTORY  10/23/2017    Endovenous Ablation Of Incompetent Vein Radiofrequency   [2]   Allergies  Allergen Reactions    Diazepam Anxiety and Other    Metoclopramide Anxiety    Metoclopramide Hcl Anxiety   [3]   Medications Prior to Admission   Medication Sig Dispense Refill Last Dose/Taking    aspirin 81 mg EC tablet Take 1 tablet (81 mg) by mouth once daily.       labetalol (Normodyne) 200 mg tablet Take 1.5 tablets (300 mg) by mouth 2 times a day. 270 tablet 3     prenatal no115/iron/folic acid (PRENATAL 19 ORAL) Take by mouth.       sertraline (Zoloft) 50 mg tablet Take 2 tablets (100 mg) by mouth once daily. 60 tablet 1

## 2025-07-12 NOTE — ANESTHESIA PREPROCEDURE EVALUATION
Patient: Sarahy Rehman    Evaluation Method: In-person visit    Procedure Information    Date: 25  Procedure: Labor Consult         Relevant Problems   Anesthesia (within normal limits)      Cardiac   (+) Chronic hypertension in pregnancy ([facility]-H[facility])   (+) Chronic hypertension with superimposed pre-eclampsia ([facility]HS-HCC)      Pulmonary (within normal limits)      Neuro   (+) Anxiety      GI   (+) Gastroesophageal reflux disease (Pregnancy related, well controlled with TUMS)      /Renal (within normal limits)      Liver (within normal limits)      Endocrine   (+) Obesity in pregnancy ([facility]HS-HCC)      Hematology (within normal limits)      Musculoskeletal (within normal limits)      GYN   (+) 36 weeks gestation of pregnancy ([facility]HS-HCC)   (+) Antepartum multigravida of advanced maternal age ([facility]HS-HCC)       Clinical information reviewed:    Allergies  Meds               NPO Detail:  No data recorded     OB/Gyn Evaluation    Present Pregnancy    Patient is pregnant now.  (+) , hypertensive disorder of pregnancy - superimposed preeclampsia with severe features   Obstetric History                Physical Exam    Airway  Mallampati: IV  TM distance: >3 FB  Neck ROM: full  Mouth openin finger widths     Cardiovascular    Dental    Pulmonary    Abdominal            Anesthesia Plan    History of general anesthesia?: yes  History of complications of general anesthesia?: no    ASA 3   (GA and epidural R/B discussed.  Questions welcomed.  Pt agrees with plan.)  Anesthetic plan and risks discussed with patient.  Use of blood products discussed with patient who consented to blood products.    Plan discussed with CAA and attending.

## 2025-07-12 NOTE — PROGRESS NOTES
Intrapartum Progress Note    Assessment/Plan   Sarahy Rehman is a 39 y.o.  at 35w5d here for IOL in s/o siPEC with SF     IOL   - latent labor   - SVE /-3 IUPC placed at time of exam 2/2 body habitus and trouble tracing contractions   - Continue to monitor for cervical change   - Pit currently at 14, uptitrate per protocol  - GBS negative  - desires vaginal delivery    siPEC w/SF   Diagnosed by severe range Bps requiring IV treatment at OSH. Patient with known cHTN on labetalol 300 mg BID at home. She was started on nifed 30 at OSH  - Normotensive with couple mild ranges   - Nifed 30 BID, uptitrate as needed  - Continue to monitor BP     Fetal Well-being  - GBS neg  - CEFM continuous, currently Cat-1    Sandra Sun MD  PGY-1 Obstetrics and Gynecology    Updates made within text by Fernando Grove MD, PGY-4       Subjective   Patient is feeling well. Denies any headaches, vision changes, SOB, chest pain, RUQ pain.     Objective   Last Vitals:  Temp Pulse Resp BP MAP Pulse Ox   (!) 35.7 °C (96.3 °F) 74 16 134/69 95 96 %     Vitals Min/Max Last 24 Hours:  Temp  Min: 35.7 °C (96.3 °F)  Max: 36.9 °C (98.4 °F)  Pulse  Min: 66  Max: 101  Resp  Min: 16  Max: 18  BP  Min: 132/71  Max: 193/103  MAP (mmHg)  Min: 95  Max: 140    Intake/Output:    Intake/Output Summary (Last 24 hours) at 2025 0908  Last data filed at 2025 0833  Gross per 24 hour   Intake 1760.28 ml   Output 175 ml   Net 1585.28 ml       Physical Examination:  General: no acute distress  HEENT: normocephalic, atraumatic  Heart: warm and well perfused  Lungs: no increased WOB  Abdomen: gravid  Extremities: moving all extremities  Neuro: awake and conversant  Psych: appropriate mood and affect    SVE: /-3, IUPC placed     FHT: 135/mod/ +accels/- decels  [place]: IUPC in place,\ Q2-3min    Lab Review:  Lab Results   Component Value Date    WBC 14.7 (H) 2025    HGB 13.1 2025    HCT 40.6 2025     2025     Lab  Results   Component Value Date    GLUCOSE 115 (H) 07/11/2025     (L) 07/11/2025    K 4.2 07/11/2025     07/11/2025    CO2 20 (L) 07/11/2025    ANIONGAP 16 07/11/2025    BUN 5 (L) 07/11/2025    CREATININE 0.50 07/11/2025    EGFR >90 07/11/2025    CALCIUM 9.2 07/11/2025    ALBUMIN 3.8 07/11/2025    PROT 7.1 07/11/2025    ALKPHOS 131 (H) 07/11/2025    ALT 13 07/11/2025    AST 20 07/11/2025    BILITOT 0.7 07/11/2025     0   Lab Value Date/Time    UTPCR 0.16 07/11/2025 1456    UTPCR 237 (H) 06/26/2025 1316    UTPCR 0.237 (H) 06/26/2025 1316    UTPCR 0.07 12/31/2024 1135    UTPCR 0.22 (H) 11/22/2022 1552    UTPCR SEE COMMENT 07/28/2022 0709

## 2025-07-12 NOTE — SIGNIFICANT EVENT
AROM    Pt comfortable with epidural infusing. CRB expelled approx 30 mins ago.    SVE 3/50/-3, AROM scant clear fluid  FHT Baseline 120, pos accels, neg decels, mod cailin  [place]: ctx q4-5 mins    A/P:  - In latent labor, on pitocin and now s/p AROM  - Cat I tracing    Rachel Branch MD PGY-4

## 2025-07-13 VITALS
HEART RATE: 86 BPM | SYSTOLIC BLOOD PRESSURE: 152 MMHG | TEMPERATURE: 97.7 F | DIASTOLIC BLOOD PRESSURE: 89 MMHG | RESPIRATION RATE: 18 BRPM | OXYGEN SATURATION: 96 %

## 2025-07-13 PROCEDURE — 2500000001 HC RX 250 WO HCPCS SELF ADMINISTERED DRUGS (ALT 637 FOR MEDICARE OP)

## 2025-07-13 PROCEDURE — 2500000004 HC RX 250 GENERAL PHARMACY W/ HCPCS (ALT 636 FOR OP/ED)

## 2025-07-13 PROCEDURE — 99199 UNLISTED SPECIAL SVC PX/RPRT: CPT

## 2025-07-13 PROCEDURE — 2500000002 HC RX 250 W HCPCS SELF ADMINISTERED DRUGS (ALT 637 FOR MEDICARE OP, ALT 636 FOR OP/ED)

## 2025-07-13 PROCEDURE — 1210000001 HC SEMI-PRIVATE ROOM DAILY

## 2025-07-13 RX ADMIN — SERTRALINE HYDROCHLORIDE 100 MG: 100 TABLET ORAL at 08:33

## 2025-07-13 RX ADMIN — ACETAMINOPHEN 975 MG: 325 TABLET ORAL at 11:34

## 2025-07-13 RX ADMIN — ENOXAPARIN SODIUM 60 MG: 100 INJECTION SUBCUTANEOUS at 17:09

## 2025-07-13 RX ADMIN — IBUPROFEN 600 MG: 600 TABLET ORAL at 11:34

## 2025-07-13 RX ADMIN — IBUPROFEN 600 MG: 600 TABLET ORAL at 17:09

## 2025-07-13 RX ADMIN — NIFEDIPINE 30 MG: 30 TABLET, FILM COATED, EXTENDED RELEASE ORAL at 17:27

## 2025-07-13 RX ADMIN — MAGNESIUM SULFATE HEPTAHYDRATE 2 G/HR: 40 INJECTION, SOLUTION INTRAVENOUS at 07:06

## 2025-07-13 RX ADMIN — IBUPROFEN 600 MG: 600 TABLET ORAL at 05:32

## 2025-07-13 RX ADMIN — ACETAMINOPHEN 975 MG: 325 TABLET ORAL at 17:09

## 2025-07-13 RX ADMIN — LABETALOL HYDROCHLORIDE 600 MG: 200 TABLET, FILM COATED ORAL at 22:13

## 2025-07-13 RX ADMIN — NIFEDIPINE 30 MG: 30 TABLET, FILM COATED, EXTENDED RELEASE ORAL at 05:31

## 2025-07-13 RX ADMIN — LABETALOL HYDROCHLORIDE 600 MG: 200 TABLET, FILM COATED ORAL at 08:33

## 2025-07-13 RX ADMIN — ACETAMINOPHEN 975 MG: 325 TABLET ORAL at 05:32

## 2025-07-13 ASSESSMENT — PAIN SCALES - GENERAL
PAINLEVEL_OUTOF10: 0 - NO PAIN
PAINLEVEL_OUTOF10: 2

## 2025-07-13 NOTE — CARE PLAN
The patient's goals for the shift include      The clinical goals for the shift include cont. to meet pp milestones, bps <160/110      Problem: Hypertensive Disorder of Pregnancy (HDP)  Goal: Minimal s/sx of HDP and BP<160/110  Outcome: Progressing  Goal: Adequate urine output (0.5 ml/kg/hr)  Outcome: Progressing     Problem: Pain - Adult  Goal: Verbalizes/displays adequate comfort level or baseline comfort level  Outcome: Progressing     Problem: Safety - Adult  Goal: Free from fall injury  Outcome: Progressing     Problem: Postpartum  Goal: Experiences normal postpartum course  Outcome: Progressing  Goal: Appropriate maternal -  bonding  Outcome: Progressing  Goal: Establish and maintain infant feeding pattern for adequate nutrition  Outcome: Progressing  Goal: Incisions, wounds, or drain sites healing without S/S of infection  Outcome: Progressing  Goal: No s/sx infection  Outcome: Progressing  Goal: No s/sx of hemorrhage  Outcome: Progressing  Goal: Minimal s/sx of HDP and BP<160/110  Outcome: Progressing

## 2025-07-13 NOTE — ANESTHESIA POSTPROCEDURE EVALUATION
Sarahy Rehman is a 39 y.o., , who had a Vaginal, Spontaneous delivery on 2025 at 35w5d and is now POD1.    She had Neuraxial Anesthesia without immediate complications noted.       Pain well controlled    Vitals:    25 0731   BP: (!) 149/73   Pulse: 73   Resp: 16   Temp:    SpO2:        Neuraxial site assessed. No visible redness or swelling or drainage. Patient able to ambulate and move all extremities without difficulty. Able to void. No complaints of nausea/vomiting. Tolerating PO intake well. No s/sx of PDPH.     Anesthesia will sign off     Slade Duarte MD

## 2025-07-13 NOTE — CARE PLAN
Problem: Hypertensive Disorder of Pregnancy (HDP)  Goal: Minimal s/sx of HDP and BP<160/110  Outcome: Progressing  Goal: Adequate urine output (0.5 ml/kg/hr)  Outcome: Progressing     Problem: Pain - Adult  Goal: Verbalizes/displays adequate comfort level or baseline comfort level  Outcome: Progressing     Problem: Safety - Adult  Goal: Free from fall injury  Outcome: Progressing     Problem: Discharge Planning  Goal: Discharge to home or other facility with appropriate resources  Outcome: Progressing     Problem: Postpartum  Goal: Experiences normal postpartum course  Outcome: Progressing  Goal: Appropriate maternal -  bonding  Outcome: Progressing  Goal: Establish and maintain infant feeding pattern for adequate nutrition  Outcome: Progressing  Goal: Incisions, wounds, or drain sites healing without S/S of infection  Outcome: Progressing  Goal: No s/sx infection  Outcome: Progressing  Goal: No s/sx of hemorrhage  Outcome: Progressing  Goal: Minimal s/sx of HDP and BP<160/110  Outcome: Progressing     The patient's goals for the shift include  normal blood pressures, get some rest     The clinical goals for the shift include assessments WNL, VSS, BP <160/110, continue to meet postpartum milestones, tolerate mag infusion     Over the shift, the patient did make progress toward the following goals.

## 2025-07-13 NOTE — PROGRESS NOTES
Postpartum Progress Note    Assessment/Plan   Sarahy Rehamn is a 39 y.o., , who delivered at 35w5d gestation and is now postpartum day 1.    Now PPD#1 s/p  on   - continue routine postpartum care  - pain well controlled on po medications  - DVT Score (IF A SCORE IS NOT CALCULATING, MUST SELECT A BMI TO COMPLETE): 5, for ppx lovenox  - Hgb:   Results from last 7 days   Lab Units 25  2248 25  1456   HEMOGLOBIN g/dL 13.1 12.2       siPEC w/SF   Diagnosed by severe range Bps requiring IV treatment at OSH. Patient with known cHTN on labetalol 300 mg BID at home. She was started on nifed 30 at OSH  - Normotensive with couple mild ranges   - BP regimen: Nifed 30 BID, labetalol 600 BID  -  HELLP labs neg x2, P:C 0.16,  (possibly hemolyzed), uric acid wnl  - asymptomatic   - Mainly normotensive, some Mild range BPs  - Continue to monitor BP   - adequate UOP    Maternal Well-Being  - emotional support provided, discussed resources including OBGYN behavioral health      Feeding  - breastfeeding/pumping encouraged; lactation consult prn    Contraception  - partner vasectomy     Dispo  - anticipate d/c on PPD #3 if meeting all postpartum milestones and for BP control  - for f/u 4-6 weeks with Primary OB provider     Seen and d/w Dr. Simone Sun MD  PGY-1 Obstetrics and Gynecology    Subjective   Her pain is well controlled with current medications  She is not ambulating well on Mg  She is tolerating a Adult diet Regular  She reports no breast or nursing problems  She reports emotional concerns about her baby today.   Her plan for contraception is vasectomy     She reports feeling well. We discussed that after her Mg is off at 12:20 she can go visit baby in the NICU.Otherwise denies HA, vision changes, SOB, CP, or RUQ pain.     Objective     Last Vitals:  Temp Pulse Resp BP MAP Pulse Ox   36.1 °C (97 °F) 73 19 (!) 149/73 105 (!) 95 %     Vitals Min/Max Last 24 Hours:  Temp  Min:  35.6 °C (96.1 °F)  Max: 37.3 °C (99.1 °F)  Pulse  Min: 67  Max: 96  Resp  Min: 16  Max: 20  BP  Min: 108/59  Max: 174/79  MAP (mmHg)  Min: 74  Max: 114    Intake/Output:     Intake/Output Summary (Last 24 hours) at 7/13/2025 0735  Last data filed at 7/13/2025 0630  Gross per 24 hour   Intake 3366.95 ml   Output 3735 ml   Net -368.05 ml       Physical Exam:  General: no acute distress  HEENT: normocephalic, atraumatic  Heart: warm and well perfused  Lungs: no increased WOB  Abdomen: gravid  Extremities: moving all extremities  Neuro: awake and conversant  Psych: appropriate mood and affect    Lab Data:  Lab Results   Component Value Date    WBC 14.7 (H) 07/11/2025    HGB 13.1 07/11/2025    HCT 40.6 07/11/2025     07/11/2025     Lab Results   Component Value Date    GLUCOSE 105 (H) 07/12/2025     (L) 07/12/2025    K 3.5 07/12/2025     07/12/2025    CO2 21 07/12/2025    ANIONGAP 17 07/12/2025    BUN 7 07/12/2025    CREATININE 0.57 07/12/2025    EGFR >90 07/12/2025    CALCIUM 8.2 (L) 07/12/2025    ALBUMIN 3.5 07/12/2025    PROT 6.5 07/12/2025    ALKPHOS 117 (H) 07/12/2025    ALT 13 07/12/2025    AST 21 07/12/2025    BILITOT 0.6 07/12/2025     0   Lab Value Date/Time    UTPCR 0.16 07/11/2025 1456    UTPCR 237 (H) 06/26/2025 1316    UTPCR 0.237 (H) 06/26/2025 1316    UTPCR 0.07 12/31/2024 1135    UTPCR 0.22 (H) 11/22/2022 1552    UTPCR SEE COMMENT 07/28/2022 0709

## 2025-07-14 ENCOUNTER — APPOINTMENT (OUTPATIENT)
Dept: VASCULAR MEDICINE | Facility: HOSPITAL | Age: 40
End: 2025-07-14
Payer: COMMERCIAL

## 2025-07-14 ENCOUNTER — APPOINTMENT (OUTPATIENT)
Dept: OBSTETRICS AND GYNECOLOGY | Facility: CLINIC | Age: 40
End: 2025-07-14
Payer: COMMERCIAL

## 2025-07-14 DIAGNOSIS — Z3A.36 36 WEEKS GESTATION OF PREGNANCY (HHS-HCC): ICD-10-CM

## 2025-07-14 LAB
GP B STREP GENITAL QL CULT: NORMAL
HOLD SPECIMEN: NORMAL

## 2025-07-14 PROCEDURE — 2500000002 HC RX 250 W HCPCS SELF ADMINISTERED DRUGS (ALT 637 FOR MEDICARE OP, ALT 636 FOR OP/ED)

## 2025-07-14 PROCEDURE — 1210000001 HC SEMI-PRIVATE ROOM DAILY

## 2025-07-14 PROCEDURE — 93970 EXTREMITY STUDY: CPT

## 2025-07-14 PROCEDURE — 2500000001 HC RX 250 WO HCPCS SELF ADMINISTERED DRUGS (ALT 637 FOR MEDICARE OP)

## 2025-07-14 PROCEDURE — 2500000004 HC RX 250 GENERAL PHARMACY W/ HCPCS (ALT 636 FOR OP/ED)

## 2025-07-14 PROCEDURE — 93970 EXTREMITY STUDY: CPT | Performed by: SURGERY

## 2025-07-14 RX ORDER — NIFEDIPINE 30 MG/1
30 TABLET, FILM COATED, EXTENDED RELEASE ORAL ONCE
Status: COMPLETED | OUTPATIENT
Start: 2025-07-14 | End: 2025-07-14

## 2025-07-14 RX ORDER — NIFEDIPINE 60 MG/1
60 TABLET, FILM COATED, EXTENDED RELEASE ORAL
Status: DISCONTINUED | OUTPATIENT
Start: 2025-07-15 | End: 2025-07-15 | Stop reason: HOSPADM

## 2025-07-14 RX ORDER — NIFEDIPINE 30 MG/1
30 TABLET, FILM COATED, EXTENDED RELEASE ORAL NIGHTLY
Status: DISCONTINUED | OUTPATIENT
Start: 2025-07-14 | End: 2025-07-15 | Stop reason: HOSPADM

## 2025-07-14 RX ADMIN — ENOXAPARIN SODIUM 60 MG: 100 INJECTION SUBCUTANEOUS at 18:43

## 2025-07-14 RX ADMIN — IBUPROFEN 600 MG: 600 TABLET ORAL at 06:13

## 2025-07-14 RX ADMIN — IBUPROFEN 600 MG: 600 TABLET ORAL at 00:06

## 2025-07-14 RX ADMIN — NIFEDIPINE 30 MG: 30 TABLET, FILM COATED, EXTENDED RELEASE ORAL at 06:14

## 2025-07-14 RX ADMIN — NIFEDIPINE 30 MG: 30 TABLET, FILM COATED, EXTENDED RELEASE ORAL at 12:47

## 2025-07-14 RX ADMIN — ACETAMINOPHEN 975 MG: 325 TABLET ORAL at 00:06

## 2025-07-14 RX ADMIN — ACETAMINOPHEN 975 MG: 325 TABLET ORAL at 06:13

## 2025-07-14 RX ADMIN — NIFEDIPINE 30 MG: 30 TABLET, FILM COATED, EXTENDED RELEASE ORAL at 21:08

## 2025-07-14 RX ADMIN — ACETAMINOPHEN 975 MG: 325 TABLET ORAL at 18:43

## 2025-07-14 RX ADMIN — LABETALOL HYDROCHLORIDE 600 MG: 200 TABLET, FILM COATED ORAL at 21:08

## 2025-07-14 RX ADMIN — IBUPROFEN 600 MG: 600 TABLET ORAL at 12:47

## 2025-07-14 RX ADMIN — LABETALOL HYDROCHLORIDE 600 MG: 200 TABLET, FILM COATED ORAL at 10:23

## 2025-07-14 RX ADMIN — ACETAMINOPHEN 975 MG: 325 TABLET ORAL at 12:47

## 2025-07-14 RX ADMIN — IBUPROFEN 600 MG: 600 TABLET ORAL at 18:43

## 2025-07-14 RX ADMIN — SERTRALINE HYDROCHLORIDE 100 MG: 100 TABLET ORAL at 10:23

## 2025-07-14 ASSESSMENT — PAIN SCALES - GENERAL
PAINLEVEL_OUTOF10: 0 - NO PAIN

## 2025-07-14 ASSESSMENT — PAIN DESCRIPTION - DESCRIPTORS: DESCRIPTORS: SORE

## 2025-07-14 NOTE — PROGRESS NOTES
Postpartum Progress Note    Assessment/Plan   Sarahy Rehman is a 39 y.o., , who delivered at 35w5d gestation via  who presented from OSH for IOL in the setting of superimposed preeclampsia with severe features. Delivery was complicated by a single perineal abrasion that was repaired.    Now PPD#2 s/p  on   - continue routine postpartum care  - pain well controlled on po medications  - DVT Score (IF A SCORE IS NOT CALCULATING, MUST SELECT A BMI TO COMPLETE): 5, for ppx lovenox  - Hgb:   Results from last 7 days   Lab Units 25  2248 25  1456   HEMOGLOBIN g/dL 13.1 12.2     Lower Extremity Swelling  - Noted increased swelling in the left foot and ankle compared to the right side  - No calf tenderness  - Bilateral lower extremity dopplers negative for acute DVT ()  -VSS, low concern at this time for acute DVT or PE  - Continue lovenox for DVT prophylaxis    siPEC w/SF   Diagnosed by severe range Bps requiring IV treatment at OSH. Patient with known cHTN on labetalol 300 mg BID at home. She was started on nifed 30 at OSH  - BP regimen: Nifed 60/30 (increased ), labetalol 600 BID  -  HELLP labs neg x2, P:C 0.16,  (possibly hemolyzed), uric acid wnl  - asymptomatic   - normotensive to high mild range in past 24 hours  - Continue to monitor BP   - adequate UOP    Maternal Well-Being  - emotional support provided, discussed resources including OBGYN behavioral health   - All questions and concerns addressed  - Continue zoloft 100 mg for anxiety/depression     Feeding  - breastfeeding/pumping encouraged; lactation consult prn    Contraception  - partner vasectomy     Dispo  - anticipate d/c on PPD #3 if meeting all postpartum milestones and for BP control  - for f/u 4-6 weeks with Primary OB provider   - BP check 2-5 days postpartum    Diandra Quesada MD  PGY-1 Obstetrics and Gynecology    Subjective   Her pain is well controlled with current medications  She is ambulating  well  She is tolerating a Adult diet Regular  She reports no breast or nursing problems  She reports no emotional concerns today  Her plan for contraception is partner vasectomy     She reports feeling well. She denies HA, vision changes, SOB, CP, or RUQ pain.     Objective     Last Vitals:  Temp Pulse Resp BP MAP Pulse Ox   36.7 °C (98.1 °F) 86 16 (!) 144/99 (PORTIA Haynes aware) 114 97 %     Vitals Min/Max Last 24 Hours:  Temp  Min: 36.4 °C (97.5 °F)  Max: 36.7 °C (98.1 °F)  Pulse  Min: 61  Max: 86  Resp  Min: 16  Max: 20  BP  Min: 127/80  Max: 152/89  MAP (mmHg)  Min: 96  Max: 114    Intake/Output:   No intake or output data in the 24 hours ending 07/14/25 1225    Physical Exam:  General: no acute distress  HEENT: normocephalic, atraumatic  Heart: warm and well perfused  Lungs: no increased WOB  Abdomen: fundus firm and palpated below the umbilicus  Extremities: moving all extremities  Neuro: awake and conversant  Psych: appropriate mood and affect    Lab Data:  Lab Results   Component Value Date    WBC 14.7 (H) 07/11/2025    HGB 13.1 07/11/2025    HCT 40.6 07/11/2025     07/11/2025     Lab Results   Component Value Date    GLUCOSE 105 (H) 07/12/2025     (L) 07/12/2025    K 3.5 07/12/2025     07/12/2025    CO2 21 07/12/2025    ANIONGAP 17 07/12/2025    BUN 7 07/12/2025    CREATININE 0.57 07/12/2025    EGFR >90 07/12/2025    CALCIUM 8.2 (L) 07/12/2025    ALBUMIN 3.5 07/12/2025    PROT 6.5 07/12/2025    ALKPHOS 117 (H) 07/12/2025    ALT 13 07/12/2025    AST 21 07/12/2025    BILITOT 0.6 07/12/2025     0   Lab Value Date/Time    UTPCR 0.16 07/11/2025 1456    UTPCR 237 (H) 06/26/2025 1316    UTPCR 0.237 (H) 06/26/2025 1316    UTPCR 0.07 12/31/2024 1135    UTPCR 0.22 (H) 11/22/2022 1552    UTPCR SEE COMMENT 07/28/2022 0709

## 2025-07-14 NOTE — SIGNIFICANT EVENT
Significant Event Note  Lower Extremity Swelling    Sarahy Rehman is a 38 yo  on PPD #2 after .    S: Called to bedside to assess bilateral lower extremity swelling, greater in LLE. Patient reports increased swelling in left foot and ankle compared to right side. Denies any tenderness or pain. Denies SOB and CP. Patient otherwise resting comfortably in bed.     O:  /80 (BP Location: Left arm, Patient Position: Sitting)   Pulse 70   Temp 36.5 °C (97.7 °F) (Temporal)   Resp 18   LMP  (LMP Unknown)   SpO2 96%   Breastfeeding No     General: In no acute distress  HEENT: Normocephalic, atraumatic  Pulm: Breathing comfortably on room air  CV: Warm, well perfused  Abdomen: nondistended  Extremities: Moving all extremities, +1 pitting edema bilaterally, left ankle and calf width greater than right, no calf tenderness  Neuro: Alert and oriented  Psych: Appropriate mood and affect    A/P: Sarahy Rehman is a 38 yo  on PPD #2 after  with new onset LE swelling, noted to be greater on left side.    Left Lower Extremity Swelling  - Due to unilateral increase in lower extremity swelling and postpartum status there high c/f LE DVT.   - Plan for bilateral LE US to assess for DVT  - Plan to continue Lovenox for DVT ppx  - Will continue to monitor for sx    D/w Dr. Jameel Franco MD  PGY-1, Obstetrics & Gynecology

## 2025-07-14 NOTE — CARE PLAN
The patient's goals for the shift include to bond with     The clinical goals for the shift include blood pressure to remain wnl throughout shift    Over the shift, the patient made progress toward the following goals.   Problem: Postpartum  Goal: Experiences normal postpartum course  Outcome: Progressing  Goal: Appropriate maternal -  bonding  Outcome: Progressing  Goal: Establish and maintain infant feeding pattern for adequate nutrition  Outcome: Progressing  Goal: Incisions, wounds, or drain sites healing without S/S of infection  Outcome: Progressing   Patient had one mild BP controlled with medication. No pain expressed. Patient resting. Lochia remains scant. Fundus firm.

## 2025-07-14 NOTE — CARE PLAN
The patient's goals for the shift include bond with  and rest    The clinical goals for the shift include WNL vitals and assessments      Problem: Hypertensive Disorder of Pregnancy (HDP)  Goal: Minimal s/sx of HDP and BP<160/110  Outcome: Progressing  Goal: Adequate urine output (0.5 ml/kg/hr)  Outcome: Progressing     Problem: Pain - Adult  Goal: Verbalizes/displays adequate comfort level or baseline comfort level  Outcome: Progressing     Problem: Safety - Adult  Goal: Free from fall injury  Outcome: Progressing     Problem: Discharge Planning  Goal: Discharge to home or other facility with appropriate resources  Outcome: Progressing     Problem: Postpartum  Goal: Experiences normal postpartum course  Outcome: Progressing  Goal: Appropriate maternal -  bonding  Outcome: Progressing  Goal: Establish and maintain infant feeding pattern for adequate nutrition  Outcome: Progressing  Goal: Incisions, wounds, or drain sites healing without S/S of infection  Outcome: Progressing  Goal: No s/sx infection  Outcome: Progressing  Goal: No s/sx of hemorrhage  Outcome: Progressing  Goal: Minimal s/sx of HDP and BP<160/110  Outcome: Progressing    Vital signs stable throughout the shift, lochia has been WNL, patient is without s/s of HDP. Patient is bonding well with her !

## 2025-07-15 ENCOUNTER — TELEPHONE (OUTPATIENT)
Dept: OBSTETRICS AND GYNECOLOGY | Facility: CLINIC | Age: 40
End: 2025-07-15
Payer: COMMERCIAL

## 2025-07-15 ENCOUNTER — PHARMACY VISIT (OUTPATIENT)
Dept: PHARMACY | Facility: CLINIC | Age: 40
End: 2025-07-15
Payer: COMMERCIAL

## 2025-07-15 VITALS
SYSTOLIC BLOOD PRESSURE: 127 MMHG | BODY MASS INDEX: 46.28 KG/M2 | RESPIRATION RATE: 20 BRPM | OXYGEN SATURATION: 94 % | HEART RATE: 83 BPM | WEIGHT: 277.78 LBS | TEMPERATURE: 97.3 F | DIASTOLIC BLOOD PRESSURE: 84 MMHG | HEIGHT: 65 IN

## 2025-07-15 PROCEDURE — 99238 HOSP IP/OBS DSCHRG MGMT 30/<: CPT | Performed by: STUDENT IN AN ORGANIZED HEALTH CARE EDUCATION/TRAINING PROGRAM

## 2025-07-15 PROCEDURE — 2500000001 HC RX 250 WO HCPCS SELF ADMINISTERED DRUGS (ALT 637 FOR MEDICARE OP): Performed by: STUDENT IN AN ORGANIZED HEALTH CARE EDUCATION/TRAINING PROGRAM

## 2025-07-15 PROCEDURE — 2500000002 HC RX 250 W HCPCS SELF ADMINISTERED DRUGS (ALT 637 FOR MEDICARE OP, ALT 636 FOR OP/ED)

## 2025-07-15 PROCEDURE — 2500000001 HC RX 250 WO HCPCS SELF ADMINISTERED DRUGS (ALT 637 FOR MEDICARE OP)

## 2025-07-15 PROCEDURE — RXMED WILLOW AMBULATORY MEDICATION CHARGE

## 2025-07-15 RX ORDER — ACETAMINOPHEN 325 MG/1
975 TABLET ORAL EVERY 6 HOURS PRN
Qty: 180 TABLET | Refills: 0 | Status: SHIPPED | OUTPATIENT
Start: 2025-07-15

## 2025-07-15 RX ORDER — LABETALOL 200 MG/1
800 TABLET, FILM COATED ORAL 2 TIMES DAILY
Qty: 240 TABLET | Refills: 1 | Status: SHIPPED | OUTPATIENT
Start: 2025-07-15 | End: 2025-09-13

## 2025-07-15 RX ORDER — IBUPROFEN 600 MG/1
600 TABLET, FILM COATED ORAL EVERY 6 HOURS PRN
Qty: 60 TABLET | Refills: 0 | Status: SHIPPED | OUTPATIENT
Start: 2025-07-15

## 2025-07-15 RX ORDER — LABETALOL 200 MG/1
800 TABLET, FILM COATED ORAL 2 TIMES DAILY
Status: DISCONTINUED | OUTPATIENT
Start: 2025-07-15 | End: 2025-07-15 | Stop reason: HOSPADM

## 2025-07-15 RX ORDER — NIFEDIPINE 60 MG/1
60 TABLET, FILM COATED, EXTENDED RELEASE ORAL EVERY 12 HOURS
Qty: 60 TABLET | Refills: 1 | Status: SHIPPED | OUTPATIENT
Start: 2025-07-15 | End: 2025-09-13

## 2025-07-15 RX ADMIN — IBUPROFEN 600 MG: 600 TABLET ORAL at 06:27

## 2025-07-15 RX ADMIN — ACETAMINOPHEN 975 MG: 325 TABLET ORAL at 00:19

## 2025-07-15 RX ADMIN — NIFEDIPINE 60 MG: 60 TABLET, FILM COATED, EXTENDED RELEASE ORAL at 06:26

## 2025-07-15 RX ADMIN — LABETALOL HYDROCHLORIDE 800 MG: 200 TABLET, FILM COATED ORAL at 08:03

## 2025-07-15 RX ADMIN — SERTRALINE HYDROCHLORIDE 100 MG: 100 TABLET ORAL at 08:03

## 2025-07-15 RX ADMIN — IBUPROFEN 600 MG: 600 TABLET ORAL at 00:19

## 2025-07-15 RX ADMIN — ACETAMINOPHEN 975 MG: 325 TABLET ORAL at 06:26

## 2025-07-15 NOTE — CARE PLAN
The patient's goals for the shift include Blood pressure to remain wnl throughout shift    The clinical goals for the shift include BPs within normal range      Problem: Hypertensive Disorder of Pregnancy (HDP)  Goal: Minimal s/sx of HDP and BP<160/110  Outcome: Adequate for Discharge  Goal: Adequate urine output (0.5 ml/kg/hr)  Outcome: Adequate for Discharge     Problem: Pain - Adult  Goal: Verbalizes/displays adequate comfort level or baseline comfort level  Outcome: Adequate for Discharge     Problem: Safety - Adult  Goal: Free from fall injury  Outcome: Adequate for Discharge     Problem: Discharge Planning  Goal: Discharge to home or other facility with appropriate resources  Outcome: Adequate for Discharge     Problem: Postpartum  Goal: Experiences normal postpartum course  Outcome: Adequate for Discharge  Goal: Appropriate maternal -  bonding  Outcome: Adequate for Discharge  Goal: Establish and maintain infant feeding pattern for adequate nutrition  Outcome: Adequate for Discharge  Goal: Incisions, wounds, or drain sites healing without S/S of infection  Outcome: Adequate for Discharge  Goal: No s/sx infection  Outcome: Adequate for Discharge  Goal: No s/sx of hemorrhage  Outcome: Adequate for Discharge  Goal: Minimal s/sx of HDP and BP<160/110  Outcome: Adequate for Discharge   Pt ready for dc today

## 2025-07-15 NOTE — CARE PLAN
Patient alert, oriented x 4, satting well on room air. BP stable. Complains of pain to rectum, flexiseal in place, sheeba care, cream applied, repositioned. Vanco added for C-diff. Miralex and laxitives for Ileus. Cpap worn overnight and tolerated well.  Akanksha The patient's goals for the shift include Blood pressure to remain wnl throughout shift    The clinical goals for the shift include blood pressure to remain wnl throughout shift    Over the shift, the patient made progress toward the following goals.   Problem: Postpartum  Goal: Experiences normal postpartum course  Outcome: Progressing  Goal: Appropriate maternal -  bonding  Outcome: Progressing  Goal: Establish and maintain infant feeding pattern for adequate nutrition  Outcome: Progressing  Goal: Incisions, wounds, or drain sites healing without S/S of infection  Outcome: Progressing  Goal: No s/sx infection  Outcome: Progressing  Goal: No s/sx of hemorrhage  Outcome: Progressing   Minimum pain expressed. Mild range blood pressures controlled with medication. Patient continues to ambulate in room and rest.    Goal  Description  Patient's Short Term Goal: to get out of bed    Interventions:   - pt/ot eval  - use walker  - up to chair for meals  - See additional Care Plan goals for specific interventions   Outcome: Progressing     Problem: CARDIOVASCULAR - ADULT limits  Description  INTERVENTIONS:  - Monitor labs and rhythm and assess patient for signs and symptoms of electrolyte imbalances  - Administer electrolyte replacement as ordered  - Monitor response to electrolyte replacements, including rhythm and repeat

## 2025-07-15 NOTE — DISCHARGE SUMMARY
Postpartum Discharge Summary    Admission Date: 2025  Discharge Date: 07/15/25     Discharge Diagnosis  Problem List Items Addressed This Visit       Chronic hypertension with superimposed pre-eclampsia (HHS-HCC)    Relevant Medications    labetalol (Normodyne) 200 mg tablet    NIFEdipine ER (Adalat CC) 60 mg 24 hr tablet     Other Visit Diagnoses         Swelling of lower extremity    -  Primary    Relevant Orders    Vascular US lower extremity venous duplex bilateral (Completed)      Generalized edema           (normal spontaneous vaginal delivery) (HHS-HCC)        Relevant Medications    acetaminophen (Tylenol) 325 mg tablet    ibuprofen 600 mg tablet             Sarahy Rehman is a 39 y.o. female now  and postpartum day 3      Pregnancy notable for:  Abnormal 1 hour, normal 3 hr  Hx of sPEC  AMA - rrcfdna   Anxiety - zoloft 100 mg daily  Chronic HTN - labetalol 600 BID/ nifed  baseline HELLP labs wnl, P:C 0.237   BMI 46  Hx of PTD @34 wks iso siPEC    Hospital Course  Admitted for IOL in s/o siPEC w SF   Delivery Date: 2025 12:20 PM  Delivery type: Vaginal, Spontaneous   GA at delivery: 35w5d  Outcome: Living  Anesthesia during delivery: Epidural  Intrapartum complications: None  Feeding method: Breastfeeding Status: No     Delivery complications: none  Contraception at discharge: partner s/p vasectomy    Complications Requiring Follow-Up  siPEC w/ SF  - Diagnosed by sustained severe range blood pressures requiring IV treatment at OSH. Patient with known cHTN on labetalol 300 mg BID during pregnancy  - BP Regimen: Nifedipine XR 60 mg BID + Labetalol 800 mg BID   - persistent mild range BP overnight thus regimen titrated this AM  - recommend pt stay inpatient an additional day for BP monitoring in the s/o medication titration however pt declines, state 140s/80s is baseline for her   - most recent BP normotensive  - asymptomatic   - HELLP labs negative x 2   - s/p Mg gtt  - BP cuff for  home     Anxiety  - continue Zoloft 100 mg daily     Pertinent Subjective and Physical Exam At Time of Discharge  Patient seen at bedside - doing well and no acute events overnight. Pain well-controlled on current regimen, lochia light, voiding spontaneously, passing flatus, ambulating independently, and tolerating PO.     Vitals:    07/15/25 1229   BP: 127/84   Pulse: 83   Resp: 20   Temp: 36.3 °C (97.3 °F)   SpO2: 94%       General: well appearing, well-nourished, postpartum  Obstetric: abdomen soft/non-tender, fundus firm below umbilicus, lochia light  Skin: No rashes/lesions/erythema  Neuro: A/Ox3, conversational  GI: +flatus  Respiratory: Even and unlabored on RA  Extremities: No edema, discoloration, or pain in BLE, equal and palpable DP and PT pulses    Psych: appropriate mood and affect     Discharge Meds     Your medication list        START taking these medications        Instructions Last Dose Given Next Dose Due   acetaminophen 325 mg tablet  Commonly known as: Tylenol      Take 3 tablets (975 mg) by mouth every 6 hours if needed for mild pain (1 - 3) or moderate pain (4 - 6).       ibuprofen 600 mg tablet      Take 1 tablet (600 mg) by mouth every 6 hours if needed for mild pain (1 - 3) or moderate pain (4 - 6).       NIFEdipine ER 60 mg 24 hr tablet  Commonly known as: Adalat CC      Take 1 tablet (60 mg) by mouth every 12 hours. Do not crush, chew, or split.              CHANGE how you take these medications        Instructions Last Dose Given Next Dose Due   labetalol 200 mg tablet  Commonly known as: Normodyne  What changed: how much to take      Take 4 tablets (800 mg) by mouth 2 times a day.              CONTINUE taking these medications        Instructions Last Dose Given Next Dose Due   PRENATAL 19 ORAL           sertraline 50 mg tablet  Commonly known as: Zoloft      Take 2 tablets (100 mg) by mouth once daily.              STOP taking these medications      aspirin 81 mg EC tablet                   Where to Get Your Medications        [facility]  [address] [facility] [address]      Hours: 8:30 AM to 5 PM Mon-Fri Phone: [phone]   acetaminophen 325 mg tablet  ibuprofen 600 mg tablet  labetalol 200 mg tablet  NIFEdipine ER 60 mg 24 hr tablet          Test Results Pending At Discharge  Pending Labs       Order Current Status    Surgical Pathology Exam - [facility] In process            Outpatient Follow-Up  Future Appointments   Date Time Provider Department Center   7/18/2025  8:30 AM John Burns MD [id number] [facility]   8/25/2025  2:00 PM Aguilar Rincon DO [id number] [facility]   9/3/2025  9:30 AM BRADEN Box-CHENCHO [id number] [place]     Patient instructed to call to schedule  2-5 days for BP check and 4-6 weeks for routine postpartum visit  with primary OB.    I spent 25 minutes in the professional and overall care of this patient.      Nadia Newsome PA-C  07/15/25 2:44 PM  [place]

## 2025-07-17 LAB
LABORATORY COMMENT REPORT: NORMAL
PATH REPORT.FINAL DX SPEC: NORMAL
PATH REPORT.GROSS SPEC: NORMAL
PATH REPORT.RELEVANT HX SPEC: NORMAL
PATH REPORT.TOTAL CANCER: NORMAL

## 2025-07-17 NOTE — PROGRESS NOTES
Postpartum Progress Note    Sarahy Rehman is a 39 y.o., , who delivered at 35w5d gestation and is now postpartum week 1 presents for BP check.  S/p  on 25.  IOL for CHTN with siPEC with severe features. Delivered at [facility].  Discharged home on Nifedipine 60 mg and labetalol 400 mg BID (rx is written for 800 BID but confirmed with Sarahy that she is taking 400mg BID and not 800mg  Not checking BPs at home. No logs to review.   No PEC sx: HA, vision changes, CP, SOB, RUQ pain.  No fatigue, lightheaded, dizzy.    pregnancy notable for: CHTN, AMA, obesity, anxiety  Adjusting well to baby. Has help at home  Bottle/breast feeding without issues. no erythema, pain, engorgement    OB hx:      Physical exam  /70   Wt 118 kg (261 lb)   LMP  (LMP Unknown)   Breastfeeding No   BMI 43.43 kg/m²      Physical Exam  Constitutional:       Appearance: Normal appearance.   HENT:      Head: Normocephalic and atraumatic.     Eyes:      Extraocular Movements: Extraocular movements intact.      Conjunctiva/sclera: Conjunctivae normal.      Pupils: Pupils are equal, round, and reactive to light.     Pulmonary:      Effort: Pulmonary effort is normal.     Skin:     General: Skin is dry.     Neurological:      General: No focal deficit present.      Mental Status: She is alert.     Psychiatric:         Mood and Affect: Mood normal.         Behavior: Behavior normal.         Thought Content: Thought content normal.         Judgment: Judgment normal.         Assessment/Plan  PP BP check:  - Advised to take Bps at home and record so we can review logs and make medications adjustments as needed, either up or down titrate.   - reviewed PEC and hypotension si/sx.   - F/up on 25 on virtual visit for BP review with logs.   - RTC in 5 weeks for a 6-week PPV or sooner if any concerns.      Scribe Attestation  By signing my name below, ITrisha, Scribelizabeth   attest that this documentation has been prepared  under the direction and in the presence of John Burns MD.

## 2025-07-18 ENCOUNTER — TELEPHONE (OUTPATIENT)
Dept: OBSTETRICS AND GYNECOLOGY | Facility: CLINIC | Age: 40
End: 2025-07-18

## 2025-07-18 ENCOUNTER — POSTPARTUM VISIT (OUTPATIENT)
Dept: OBSTETRICS AND GYNECOLOGY | Facility: CLINIC | Age: 40
End: 2025-07-18
Payer: COMMERCIAL

## 2025-07-18 VITALS — DIASTOLIC BLOOD PRESSURE: 70 MMHG | SYSTOLIC BLOOD PRESSURE: 120 MMHG | BODY MASS INDEX: 43.43 KG/M2 | WEIGHT: 261 LBS

## 2025-07-18 DIAGNOSIS — O11.9 CHRONIC HYPERTENSION WITH SUPERIMPOSED PRE-ECLAMPSIA (HHS-HCC): ICD-10-CM

## 2025-07-18 DIAGNOSIS — O10.919 CHRONIC HYPERTENSION IN PREGNANCY (HHS-HCC): ICD-10-CM

## 2025-07-18 PROBLEM — R73.09 GLUCOSE TOLERANCE TEST ABNORMAL: Status: RESOLVED | Noted: 2025-06-06 | Resolved: 2025-07-18

## 2025-07-18 PROBLEM — Z3A.36 36 WEEKS GESTATION OF PREGNANCY (HHS-HCC): Status: RESOLVED | Noted: 2025-01-02 | Resolved: 2025-07-18

## 2025-07-18 PROBLEM — O99.210 OBESITY IN PREGNANCY (HHS-HCC): Status: RESOLVED | Noted: 2024-08-02 | Resolved: 2025-07-18

## 2025-07-18 PROBLEM — O09.899 HISTORY OF PRETERM DELIVERY, CURRENTLY PREGNANT (HHS-HCC): Status: RESOLVED | Noted: 2025-01-15 | Resolved: 2025-07-18

## 2025-07-21 ENCOUNTER — APPOINTMENT (OUTPATIENT)
Dept: OBSTETRICS AND GYNECOLOGY | Facility: CLINIC | Age: 40
End: 2025-07-21
Payer: COMMERCIAL

## 2025-07-21 ENCOUNTER — TELEMEDICINE (OUTPATIENT)
Dept: OBSTETRICS AND GYNECOLOGY | Facility: CLINIC | Age: 40
End: 2025-07-21
Payer: COMMERCIAL

## 2025-07-21 DIAGNOSIS — O11.9 CHRONIC HYPERTENSION WITH SUPERIMPOSED PRE-ECLAMPSIA (HHS-HCC): ICD-10-CM

## 2025-07-21 PROCEDURE — 99213 OFFICE O/P EST LOW 20 MIN: CPT | Performed by: OBSTETRICS & GYNECOLOGY

## 2025-07-21 RX ORDER — LABETALOL 200 MG/1
400 TABLET, FILM COATED ORAL 2 TIMES DAILY
Qty: 120 TABLET | Refills: 1 | Status: CANCELLED | OUTPATIENT
Start: 2025-07-21 | End: 2025-09-19

## 2025-07-21 ASSESSMENT — ENCOUNTER SYMPTOMS: HYPERTENSION: 1

## 2025-07-21 NOTE — PROGRESS NOTES
Virtual or Telephone Consent    An interactive audio and video telecommunication system which permits real time communications between the patient (at the originating site) and provider (at the distant site) was utilized to provide this telehealth service.   Verbal consent was requested and obtained from Sarahy Rehman on this date, 25 for a telehealth visit and the patient's location was confirmed at the time of the visit.    Postpartum Progress Note    Sarahy Rehman is a 39 y.o., , who delivered at 35w5d gestation and is now postpartum week 1 presents for a virtual BP check.  S/p  on 25. CHTN with siPEC with severe features  No PEC sx. No fatigue, dizziness.  BP logs reviewed. -130s/70-80s with outlier x 2 to 151/93 and 153/92.  Prior to pregnancy was on atenolol 50mg/day per pt managed by Dr. Rincon    Hx 25: IOL for CHTN with siPEC with severe features. Delivered at [facility].  Discharged home on Nifedipine 60 mg and labetalol 400 mg BID (rx is written for 800 BID but confirmed with Sarahy that she is taking 400mg BID and not 800mg  Not checking BPs at home. No logs to review.   No PEC sx: HA, vision changes, CP, SOB, RUQ pain.  No fatigue, lightheaded, dizzy.    pregnancy notable for: CHTN, AMA, obesity, anxiety  Adjusting well to baby. Has help at home  Bottle/breast feeding without issues. no erythema, pain, engorgement    OB hx:      Physical exam  LMP  (LMP Unknown)      Physical Exam  Constitutional:       Appearance: Normal appearance.   HENT:      Head: Normocephalic and atraumatic.     Eyes:      Extraocular Movements: Extraocular movements intact.      Conjunctiva/sclera: Conjunctivae normal.      Pupils: Pupils are equal, round, and reactive to light.     Pulmonary:      Effort: Pulmonary effort is normal.     Skin:     General: Skin is dry.     Neurological:      General: No focal deficit present.      Mental Status: She is alert.     Psychiatric:         Mood  and Affect: Mood normal.         Behavior: Behavior normal.         Thought Content: Thought content normal.         Judgment: Judgment normal.         Assessment/Plan  PP BP check:  Virtual visit for BP and sx review  On nifedipine 60mg/day and labetalol 400mg BID.   Keep meds as rx now.  Call if becomes sx or if Bps decrease or increase for medication management.   RTC 6 wk PPV, sooner as needed.     Scribe Attestation  By signing my name below, I, Trisha Gutierres, Scribe   attest that this documentation has been prepared under the direction and in the presence of John Burns MD.    Answers submitted by the patient for this visit:  Questionnaire about: Hypertension (Submitted on 7/21/2025)  Chief Complaint: Hypertension

## 2025-07-23 ENCOUNTER — OFFICE VISIT (OUTPATIENT)
Dept: PRIMARY CARE | Facility: CLINIC | Age: 40
End: 2025-07-23
Payer: COMMERCIAL

## 2025-07-23 VITALS
TEMPERATURE: 97.7 F | BODY MASS INDEX: 40.06 KG/M2 | HEART RATE: 73 BPM | OXYGEN SATURATION: 98 % | HEIGHT: 67 IN | SYSTOLIC BLOOD PRESSURE: 101 MMHG | DIASTOLIC BLOOD PRESSURE: 73 MMHG | WEIGHT: 255.25 LBS

## 2025-07-23 DIAGNOSIS — Z00.00 WELL ADULT HEALTH CHECK: Primary | ICD-10-CM

## 2025-07-23 DIAGNOSIS — E66.813 CLASS 3 SEVERE OBESITY DUE TO EXCESS CALORIES WITH SERIOUS COMORBIDITY AND BODY MASS INDEX (BMI) OF 40.0 TO 44.9 IN ADULT: ICD-10-CM

## 2025-07-23 DIAGNOSIS — O11.9 CHRONIC HYPERTENSION WITH SUPERIMPOSED PRE-ECLAMPSIA (HHS-HCC): ICD-10-CM

## 2025-07-23 DIAGNOSIS — Z85.820 HISTORY OF MALIGNANT MELANOMA: ICD-10-CM

## 2025-07-23 PROCEDURE — 99213 OFFICE O/P EST LOW 20 MIN: CPT | Performed by: FAMILY MEDICINE

## 2025-07-23 PROCEDURE — 3078F DIAST BP <80 MM HG: CPT | Performed by: FAMILY MEDICINE

## 2025-07-23 PROCEDURE — 3074F SYST BP LT 130 MM HG: CPT | Performed by: FAMILY MEDICINE

## 2025-07-23 PROCEDURE — 99395 PREV VISIT EST AGE 18-39: CPT | Performed by: FAMILY MEDICINE

## 2025-07-23 PROCEDURE — 3008F BODY MASS INDEX DOCD: CPT | Performed by: FAMILY MEDICINE

## 2025-07-23 ASSESSMENT — ENCOUNTER SYMPTOMS
LOSS OF SENSATION IN FEET: 0
OCCASIONAL FEELINGS OF UNSTEADINESS: 0
DEPRESSION: 0

## 2025-07-23 ASSESSMENT — ANXIETY QUESTIONNAIRES
5. BEING SO RESTLESS THAT IT IS HARD TO SIT STILL: NOT AT ALL
3. WORRYING TOO MUCH ABOUT DIFFERENT THINGS: NOT AT ALL
GAD7 TOTAL SCORE: 2
IF YOU CHECKED OFF ANY PROBLEMS ON THIS QUESTIONNAIRE, HOW DIFFICULT HAVE THESE PROBLEMS MADE IT FOR YOU TO DO YOUR WORK, TAKE CARE OF THINGS AT HOME, OR GET ALONG WITH OTHER PEOPLE: NOT DIFFICULT AT ALL
2. NOT BEING ABLE TO STOP OR CONTROL WORRYING: NOT AT ALL
7. FEELING AFRAID AS IF SOMETHING AWFUL MIGHT HAPPEN: NOT AT ALL
6. BECOMING EASILY ANNOYED OR IRRITABLE: SEVERAL DAYS
1. FEELING NERVOUS, ANXIOUS, OR ON EDGE: NOT AT ALL
4. TROUBLE RELAXING: SEVERAL DAYS

## 2025-07-23 ASSESSMENT — PATIENT HEALTH QUESTIONNAIRE - PHQ9
SUM OF ALL RESPONSES TO PHQ9 QUESTIONS 1 AND 2: 0
1. LITTLE INTEREST OR PLEASURE IN DOING THINGS: NOT AT ALL
2. FEELING DOWN, DEPRESSED OR HOPELESS: NOT AT ALL

## 2025-07-23 NOTE — PROGRESS NOTES
Subjective   Patient ID: Sarahy Rehman is a 39 y.o. female who presents for Annual Exam (Sarahy is here today for yearly CPE. Discuss starting on Ozempic, no refills needed.).  HPI Historian(s): Self    Generally feeling well.    Checking BP at home, reports 130-140 systolic. Recently delivered, reports preeclampsia.    Denies FMHx or PMHx (medullary) thyroid cancer, MEN-2, PMHx pancreatitis.    Review of Systems  No LMP recorded (lmp unknown).  Htn  Gallstones/gallbladder problems  Skin problems  Broken bones      Tobacco Use History[1]  Social History     Substance and Sexual Activity   Alcohol Use Never     Social History     Substance and Sexual Activity   Drug Use Never       Family History[2]    Patient Care Team:  Aguilar Rincon DO as PCP - General (Family Medicine)  Aguilar Rincon DO as PCP - Employee [employer] PCP  Brenda Cesar DO as Consulting Physician (Dermatology)  John Burns MD as Obstetrician (Obstetrics and Gynecology)    RX Allergies[3]    Prior to Admission medications   Medication Sig Start Date End Date Taking? Authorizing Provider   acetaminophen (Tylenol) 325 mg tablet Take 3 tablets (975 mg) by mouth every 6 hours if needed for mild pain (1 - 3) or moderate pain (4 - 6). 7/15/25   Nadia Newsome PA-C   ibuprofen 600 mg tablet Take 1 tablet (600 mg) by mouth every 6 hours if needed for mild pain (1 - 3) or moderate pain (4 - 6). 7/15/25   Nadia Newsome PA-C   labetalol (Normodyne) 200 mg tablet Take 4 tablets (800 mg) by mouth 2 times a day. 7/15/25 9/13/25  Nadia Newsome PA-C   NIFEdipine ER (Adalat CC) 60 mg 24 hr tablet Take 1 tablet (60 mg) by mouth every 12 hours. Do not crush, chew, or split. 7/15/25 9/13/25  Nadia Newsome PA-C   prenatal no115/iron/folic acid (PRENATAL 19 ORAL) Take by mouth.    Historical Provider, MD   sertraline (Zoloft) 50 mg tablet Take 2 tablets (100 mg) by mouth once daily. 6/12/25   Rosey Whitley DO        Objective     Vitals:     "07/23/25 1136   BP: 101/73   Pulse: 73   Temp: 36.5 °C (97.7 °F)   SpO2: 98%   Weight: 116 kg (255 lb 4 oz)   Height: 1.689 m (5' 6.5\")        Lab Results   Component Value Date    HGBA1C 5.4 12/31/2024    HGBA1C 5.1 06/28/2022     Lab Results   Component Value Date    LDLCALC 100 (H) 10/28/2023     Lab Results   Component Value Date    TRIG 145 10/28/2023      Lab Results   Component Value Date    CREATININE 0.57 07/12/2025    CREATININE 0.50 07/11/2025    CREATININE 0.59 07/11/2025    CREATININE 0.49 (L) 06/26/2025    CREATININE 0.66 12/31/2024    CREATININE 0.82 06/14/2024    CREATININE 0.87 05/22/2023    CREATININE 0.50 12/01/2022    CREATININE 0.48 (L) 11/28/2022    CREATININE 0.50 11/27/2022     Lab Results   Component Value Date    EGFR >90 07/12/2025    EGFR >90 07/11/2025    EGFR >90 07/11/2025    EGFR 124 06/26/2025    EGFR >90 12/31/2024    EGFR >90 06/14/2024    GFRF 88 05/22/2023    GFRF >90 12/01/2022    GFRF >90 11/28/2022    GFRF >90 11/27/2022    GFRF >90 11/24/2022    GFRF >90 11/23/2022    GFRF >90 11/22/2022    GFRF >90 07/27/2022    GFRF >90 01/26/2022      Lab Results   Component Value Date    TSH 1.78 10/28/2023          Physical Exam  Vitals and nursing note reviewed.   Constitutional:       General: She is not in acute distress.     Appearance: Normal appearance. She is well-developed.      Comments: No assistive device presently being used.   HENT:      Head: Normocephalic and atraumatic.      Nose: Nose normal.     Eyes:      General: No scleral icterus.     Extraocular Movements: Extraocular movements intact.      Conjunctiva/sclera: Conjunctivae normal.     Neck:      Thyroid: No thyromegaly.      Vascular: No carotid bruit or JVD.     Cardiovascular:      Rate and Rhythm: Normal rate and regular rhythm.      Heart sounds: Normal heart sounds.   Pulmonary:      Effort: Pulmonary effort is normal. No respiratory distress.      Breath sounds: Normal breath sounds.   Abdominal:      " General: Bowel sounds are normal. There is no distension.      Palpations: Abdomen is soft. There is no mass.      Tenderness: There is no abdominal tenderness. There is no guarding or rebound.     Musculoskeletal:      Cervical back: Normal range of motion. No tenderness.      Right lower leg: No edema.      Left lower leg: No edema.     Skin:     General: Skin is warm and dry.      Coloration: Skin is not jaundiced.     Neurological:      General: No focal deficit present.      Mental Status: She is alert and oriented to person, place, and time. Mental status is at baseline.     Psychiatric:         Mood and Affect: Mood normal.         Behavior: Behavior normal.         Thought Content: Thought content normal.         Assessment & Plan  Well adult health check  39yF recently delivered, BP well controlled, overall doing fairly well.       Chronic hypertension with superimposed pre-eclampsia (HHS-HCC)  Well controlled. Feeling well.  Orders:    Follow Up In Primary Care - Established; Future    History of malignant melanoma  Continue with dermatology.       Class 3 severe obesity due to excess calories with serious comorbidity and body mass index (BMI) of 40.0 to 44.9 in adult  Pre-pregnancy weight of 268 lbs, today 255 lbs.  Orders:    Follow Up In Primary Care - Established; Future                      [1]   Social History  Tobacco Use   Smoking Status Former    Current packs/day: 0.00    Types: Cigarettes    Quit date: 04/2022    Years since quitting: 3.3   Smokeless Tobacco Never   [2]   Family History  Problem Relation Name Age of Onset    Melanoma Mother      Diabetes Father      Diabetes Father's Brother Uncle     Diabetes Paternal Grandfather     [3]   Allergies  Allergen Reactions    Diazepam Anxiety and Other    Metoclopramide Anxiety    Metoclopramide Hcl Anxiety

## 2025-07-23 NOTE — PATIENT INSTRUCTIONS
Therapeutic lifestyle changes are the cornerstone of weight loss. In general, a weight loss program should have a balanced, calorie-appropriate, and sustainable (i.e., lifelong) and otherwise healthy diet. Although not necessarily an endorsement of Weight Watchers, their point system does help to simplify calorie counting for a calorie-appropriate diet, and can be applied to regular foods. A multivitamin might help ensure adequate intake of the nutrients it contains during weight loss. A reasonable rate of weight loss is usually 0.5 to 1 pound per week. No more than 2 lbs weight loss per week. Exercise to help maintain muscle mass (and for overall health) is usually advisable.    Several medications may be used to assist with weight loss, e.g., Wegovy, Zepbound, Saxenda, Contrave, Cedric/orlistat (over-the-counter). I do not recommend Adipex/phentermine.    In general, weight loss supplements have not been proven effective. Depending on the ingredients, there are many herbal supplements that, though they might generally be safe, do have the potential to cause, e.g., liver problems, increased risk of bleeding. Because many if not most herbal supplements have not been thoroughly studied, and are not as tightly regulated as FDA-approved medications, there may be inconsistent bioavailability and unpredictable drug interactions.    Please see the following page for some weight loss resources available at [facility]:  [email or web address]    GLP-1 analogs (e.g., Wegovy, Zepbound, Saxenda) can be used to help with weight loss. Recommend checking with insurance, to see if any of these are covered, and what prerequisites (if any) there are for prior authorization (e.g., other medications that might need to be tried first, whether a structured diet program must be tried first, and for how long, etc.).  Highly recommend adopting and adhering to therapeutic lifestyle changes, as these medications are not likely to be helpful if these changes are not made. The GLP-1 analogs can not be used if there is a personal or family history of medullary thyroid cancer, multiple endocrine neoplasia type 2 (MEN-2), or personal history of pancreatitis. GLP-1 analogs have produced thyroid cancer in laboratory animals. The FDA has been evaluating reports of suicidal thoughts or actions in patients treated with GLP-1 analogs.      Please return for a(n) blood pressure, weight, and medication follow-up appointment in 4-6 months, after tests to review results and options, earlier if any question or concern. Please schedule additional problem-focused appointment(s) to address additional problem(s). Simply mentioning or talking briefly about a problem or concern does not necessarily mean it is currently being addressed. Time constraints dictate that not every problem/concern can always be addressed.    Recommended vaccines:  Influenza, annual  Avoid taking Biotin (a vitamin, shows up particularly in hair/nail supplements) for a week prior to any blood tests, as it can interfere with certain results. Fasting for labs means 12 hours, nothing to eat or drink, except water and medications, unless directed otherwise.    For assistance with scheduling referrals or consultations, please call [phone]. For laboratory, radiology, and other tests, please call [phone] ([phone] for pediatrics). Please review prescription inserts and published information for possible adverse effects of all medications. Return after testing or consultation to review results and recommendations, if symptoms persist, change, worsen, or return, or if you have any question or concern. If you do not get results within 7-10 days, or you have any question or concern, please send a message, call the office ([phone]), or return to the office for a follow-up  appointment. For non-emergencies, you may call the office. For emergencies, call 9-1-1 or go to the nearest Emergency Department. Please schedule additional appointment(s) to address concern(s) not addressed today. An annual Wellness visit is strongly recommended. A Wellness visit should be dedicated to addressing routine health maintenance matters (e.g., cancer screenings, cardiovascular screening, etc.). Problem-focused visits, typically prompted by symptoms or specific concerns, are usually conducted separately, particularly if multiple or complex problems need to be addressed.    In general, results are not released or discussed over the telephone, but at an appointment or via  [employer]. Results of tests done through [facility] are released via  [employer] (see below).  [email or web address]   [employer] support line: [phone]

## 2025-07-28 ENCOUNTER — APPOINTMENT (OUTPATIENT)
Dept: OBSTETRICS AND GYNECOLOGY | Facility: CLINIC | Age: 40
End: 2025-07-28
Payer: COMMERCIAL

## 2025-08-04 ENCOUNTER — APPOINTMENT (OUTPATIENT)
Dept: OBSTETRICS AND GYNECOLOGY | Facility: CLINIC | Age: 40
End: 2025-08-04
Payer: COMMERCIAL

## 2025-08-09 DIAGNOSIS — F41.9 ANXIETY: ICD-10-CM

## 2025-08-11 RX ORDER — SERTRALINE HYDROCHLORIDE 50 MG/1
100 TABLET, FILM COATED ORAL DAILY
Qty: 60 TABLET | Refills: 0 | Status: SHIPPED | OUTPATIENT
Start: 2025-08-11

## 2025-08-18 ENCOUNTER — APPOINTMENT (OUTPATIENT)
Dept: OBSTETRICS AND GYNECOLOGY | Facility: CLINIC | Age: 40
End: 2025-08-18
Payer: COMMERCIAL

## 2025-08-18 PROBLEM — Z30.42 ENCOUNTER FOR SURVEILLANCE OF INJECTABLE CONTRACEPTIVE: Status: ACTIVE | Noted: 2025-08-18

## 2025-08-18 ASSESSMENT — EDINBURGH POSTNATAL DEPRESSION SCALE (EPDS)
THE THOUGHT OF HARMING MYSELF HAS OCCURRED TO ME: NEVER
I HAVE LOOKED FORWARD WITH ENJOYMENT TO THINGS: AS MUCH AS I EVER DID
TOTAL SCORE: 0
I HAVE BEEN SO UNHAPPY THAT I HAVE HAD DIFFICULTY SLEEPING: NOT AT ALL
I HAVE BLAMED MYSELF UNNECESSARILY WHEN THINGS WENT WRONG: NO, NEVER
I HAVE FELT SAD OR MISERABLE: NO, NOT AT ALL
I HAVE BEEN ANXIOUS OR WORRIED FOR NO GOOD REASON: NO, NOT AT ALL
I HAVE FELT SCARED OR PANICKY FOR NO GOOD REASON: NO, NOT AT ALL
I HAVE BEEN ABLE TO LAUGH AND SEE THE FUNNY SIDE OF THINGS: AS MUCH AS I ALWAYS COULD
I HAVE BEEN SO UNHAPPY THAT I HAVE BEEN CRYING: NO, NEVER
THINGS HAVE BEEN GETTING ON TOP OF ME: NO, I HAVE BEEN COPING AS WELL AS EVER

## 2025-08-25 ENCOUNTER — APPOINTMENT (OUTPATIENT)
Dept: PRIMARY CARE | Facility: CLINIC | Age: 40
End: 2025-08-25
Payer: COMMERCIAL

## 2025-08-27 ENCOUNTER — APPOINTMENT (OUTPATIENT)
Dept: OBSTETRICS AND GYNECOLOGY | Facility: CLINIC | Age: 40
End: 2025-08-27
Payer: COMMERCIAL

## 2025-08-27 DIAGNOSIS — Z32.02 PREGNANCY TEST NEGATIVE: ICD-10-CM

## 2025-08-27 DIAGNOSIS — Z30.42 ENCOUNTER FOR MANAGEMENT AND INJECTION OF DEPO-PROVERA: ICD-10-CM

## 2025-08-27 LAB — PREGNANCY TEST URINE, POC: NEGATIVE

## 2025-08-27 PROCEDURE — 81025 URINE PREGNANCY TEST: CPT | Performed by: NURSE PRACTITIONER

## 2025-08-27 PROCEDURE — 96372 THER/PROPH/DIAG INJ SC/IM: CPT | Performed by: NURSE PRACTITIONER

## 2025-08-27 RX ORDER — MEDROXYPROGESTERONE ACETATE 150 MG/ML
150 INJECTION, SUSPENSION INTRAMUSCULAR ONCE
Status: COMPLETED | OUTPATIENT
Start: 2025-08-27 | End: 2025-08-27

## 2025-08-27 RX ADMIN — MEDROXYPROGESTERONE ACETATE 150 MG: 150 INJECTION, SUSPENSION INTRAMUSCULAR at 10:15

## 2025-09-03 ENCOUNTER — APPOINTMENT (OUTPATIENT)
Dept: DERMATOLOGY | Facility: CLINIC | Age: 40
End: 2025-09-03
Payer: COMMERCIAL

## 2025-11-24 ENCOUNTER — APPOINTMENT (OUTPATIENT)
Dept: PRIMARY CARE | Facility: CLINIC | Age: 40
End: 2025-11-24
Payer: COMMERCIAL

## 2025-12-03 ENCOUNTER — APPOINTMENT (OUTPATIENT)
Dept: DERMATOLOGY | Facility: CLINIC | Age: 40
End: 2025-12-03
Payer: COMMERCIAL